# Patient Record
Sex: FEMALE | Race: WHITE | Employment: UNEMPLOYED | URBAN - METROPOLITAN AREA
[De-identification: names, ages, dates, MRNs, and addresses within clinical notes are randomized per-mention and may not be internally consistent; named-entity substitution may affect disease eponyms.]

---

## 2024-03-11 ENCOUNTER — APPOINTMENT (OUTPATIENT)
Dept: ULTRASOUND IMAGING | Age: 29
End: 2024-03-11

## 2024-03-11 ENCOUNTER — HOSPITAL ENCOUNTER (OUTPATIENT)
Age: 29
Setting detail: OBSERVATION
Discharge: HOME | End: 2024-03-12
Attending: INTERNAL MEDICINE | Admitting: INTERNAL MEDICINE

## 2024-03-11 DIAGNOSIS — K80.20 SYMPTOMATIC CHOLELITHIASIS: ICD-10-CM

## 2024-03-11 DIAGNOSIS — K80.20 CALCULUS OF GALLBLADDER WITHOUT CHOLECYSTITIS WITHOUT OBSTRUCTION: Primary | ICD-10-CM

## 2024-03-11 DIAGNOSIS — K81.9 CHOLECYSTITIS: ICD-10-CM

## 2024-03-11 LAB
ALBUMIN SERPL-MCNC: 4.1 GM/DL (ref 3.4–5)
ALP BLD-CCNC: 60 IU/L (ref 40–129)
ALT SERPL-CCNC: 13 U/L (ref 10–40)
ANION GAP SERPL CALCULATED.3IONS-SCNC: 14 MMOL/L (ref 7–16)
AST SERPL-CCNC: 14 IU/L (ref 15–37)
BASOPHILS ABSOLUTE: 0 K/CU MM
BASOPHILS RELATIVE PERCENT: 0.2 % (ref 0–1)
BILIRUB SERPL-MCNC: 0.6 MG/DL (ref 0–1)
BUN SERPL-MCNC: 25 MG/DL (ref 6–23)
CALCIUM SERPL-MCNC: 8.9 MG/DL (ref 8.3–10.6)
CHLORIDE BLD-SCNC: 103 MMOL/L (ref 99–110)
CO2: 22 MMOL/L (ref 21–32)
CREAT SERPL-MCNC: 0.9 MG/DL (ref 0.6–1.1)
DIFFERENTIAL TYPE: ABNORMAL
EOSINOPHILS ABSOLUTE: 0.1 K/CU MM
EOSINOPHILS RELATIVE PERCENT: 0.4 % (ref 0–3)
GFR SERPL CREATININE-BSD FRML MDRD: >60 ML/MIN/1.73M2
GLUCOSE SERPL-MCNC: 110 MG/DL (ref 70–99)
HCG QUALITATIVE: NEGATIVE
HCT VFR BLD CALC: 41 % (ref 37–47)
HEMOGLOBIN: 13.6 GM/DL (ref 12.5–16)
IMMATURE NEUTROPHIL %: 0.6 % (ref 0–0.43)
LIPASE: 30 IU/L (ref 13–60)
LYMPHOCYTES ABSOLUTE: 2.5 K/CU MM
LYMPHOCYTES RELATIVE PERCENT: 18.5 % (ref 24–44)
MCH RBC QN AUTO: 30.3 PG (ref 27–31)
MCHC RBC AUTO-ENTMCNC: 33.2 % (ref 32–36)
MCV RBC AUTO: 91.3 FL (ref 78–100)
MONOCYTES ABSOLUTE: 0.7 K/CU MM
MONOCYTES RELATIVE PERCENT: 5.1 % (ref 0–4)
NUCLEATED RBC %: 0 %
PDW BLD-RTO: 12.6 % (ref 11.7–14.9)
PLATELET # BLD: 236 K/CU MM (ref 140–440)
PMV BLD AUTO: 11.7 FL (ref 7.5–11.1)
POTASSIUM SERPL-SCNC: 3.5 MMOL/L (ref 3.5–5.1)
RBC # BLD: 4.49 M/CU MM (ref 4.2–5.4)
SEGMENTED NEUTROPHILS ABSOLUTE COUNT: 10.3 K/CU MM
SEGMENTED NEUTROPHILS RELATIVE PERCENT: 75.2 % (ref 36–66)
SODIUM BLD-SCNC: 139 MMOL/L (ref 135–145)
TOTAL IMMATURE NEUTOROPHIL: 0.08 K/CU MM
TOTAL NUCLEATED RBC: 0 K/CU MM
TOTAL PROTEIN: 7.3 GM/DL (ref 6.4–8.2)
WBC # BLD: 13.7 K/CU MM (ref 4–10.5)

## 2024-03-11 PROCEDURE — 96361 HYDRATE IV INFUSION ADD-ON: CPT

## 2024-03-11 PROCEDURE — 96375 TX/PRO/DX INJ NEW DRUG ADDON: CPT

## 2024-03-11 PROCEDURE — 6360000002 HC RX W HCPCS: Performed by: PHYSICIAN ASSISTANT

## 2024-03-11 PROCEDURE — 85025 COMPLETE CBC W/AUTO DIFF WBC: CPT

## 2024-03-11 PROCEDURE — 80053 COMPREHEN METABOLIC PANEL: CPT

## 2024-03-11 PROCEDURE — 96372 THER/PROPH/DIAG INJ SC/IM: CPT

## 2024-03-11 PROCEDURE — 96374 THER/PROPH/DIAG INJ IV PUSH: CPT

## 2024-03-11 PROCEDURE — 76705 ECHO EXAM OF ABDOMEN: CPT

## 2024-03-11 PROCEDURE — C9113 INJ PANTOPRAZOLE SODIUM, VIA: HCPCS | Performed by: PHYSICIAN ASSISTANT

## 2024-03-11 PROCEDURE — 84703 CHORIONIC GONADOTROPIN ASSAY: CPT

## 2024-03-11 PROCEDURE — 2580000003 HC RX 258: Performed by: PHYSICIAN ASSISTANT

## 2024-03-11 PROCEDURE — 99285 EMERGENCY DEPT VISIT HI MDM: CPT

## 2024-03-11 PROCEDURE — 83690 ASSAY OF LIPASE: CPT

## 2024-03-11 RX ORDER — PANTOPRAZOLE SODIUM 40 MG/10ML
40 INJECTION, POWDER, LYOPHILIZED, FOR SOLUTION INTRAVENOUS ONCE
Status: COMPLETED | OUTPATIENT
Start: 2024-03-11 | End: 2024-03-11

## 2024-03-11 RX ORDER — ONDANSETRON 2 MG/ML
4 INJECTION INTRAMUSCULAR; INTRAVENOUS EVERY 6 HOURS PRN
Status: DISCONTINUED | OUTPATIENT
Start: 2024-03-11 | End: 2024-03-12

## 2024-03-11 RX ORDER — 0.9 % SODIUM CHLORIDE 0.9 %
1000 INTRAVENOUS SOLUTION INTRAVENOUS ONCE
Status: COMPLETED | OUTPATIENT
Start: 2024-03-11 | End: 2024-03-12

## 2024-03-11 RX ADMIN — SODIUM CHLORIDE 1000 ML: 9 INJECTION, SOLUTION INTRAVENOUS at 23:22

## 2024-03-11 RX ADMIN — ONDANSETRON 4 MG: 2 INJECTION INTRAMUSCULAR; INTRAVENOUS at 23:22

## 2024-03-11 RX ADMIN — PANTOPRAZOLE SODIUM 40 MG: 40 INJECTION, POWDER, FOR SOLUTION INTRAVENOUS at 23:21

## 2024-03-11 ASSESSMENT — PAIN DESCRIPTION - DESCRIPTORS: DESCRIPTORS: SQUEEZING

## 2024-03-11 ASSESSMENT — LIFESTYLE VARIABLES
HOW MANY STANDARD DRINKS CONTAINING ALCOHOL DO YOU HAVE ON A TYPICAL DAY: 3 OR 4
HOW OFTEN DO YOU HAVE A DRINK CONTAINING ALCOHOL: MONTHLY OR LESS

## 2024-03-11 ASSESSMENT — PAIN DESCRIPTION - LOCATION: LOCATION: ABDOMEN

## 2024-03-11 ASSESSMENT — PAIN - FUNCTIONAL ASSESSMENT: PAIN_FUNCTIONAL_ASSESSMENT: 0-10

## 2024-03-11 ASSESSMENT — PAIN SCALES - GENERAL: PAINLEVEL_OUTOF10: 10

## 2024-03-12 ENCOUNTER — ANESTHESIA EVENT (OUTPATIENT)
Dept: OPERATING ROOM | Age: 29
End: 2024-03-12

## 2024-03-12 ENCOUNTER — ANESTHESIA (OUTPATIENT)
Dept: OPERATING ROOM | Age: 29
End: 2024-03-12

## 2024-03-12 VITALS
BODY MASS INDEX: 33.32 KG/M2 | HEART RATE: 60 BPM | DIASTOLIC BLOOD PRESSURE: 81 MMHG | TEMPERATURE: 97.7 F | SYSTOLIC BLOOD PRESSURE: 136 MMHG | WEIGHT: 200 LBS | HEIGHT: 65 IN | OXYGEN SATURATION: 99 % | RESPIRATION RATE: 16 BRPM

## 2024-03-12 PROBLEM — K80.20 SYMPTOMATIC CHOLELITHIASIS: Status: ACTIVE | Noted: 2024-03-12

## 2024-03-12 LAB
ALBUMIN SERPL-MCNC: 4 GM/DL (ref 3.4–5)
ALP BLD-CCNC: 59 IU/L (ref 40–128)
ALT SERPL-CCNC: 19 U/L (ref 10–40)
ANION GAP SERPL CALCULATED.3IONS-SCNC: 10 MMOL/L (ref 7–16)
AST SERPL-CCNC: 20 IU/L (ref 15–37)
BASOPHILS ABSOLUTE: 0 K/CU MM
BASOPHILS RELATIVE PERCENT: 0.2 % (ref 0–1)
BILIRUB SERPL-MCNC: 0.7 MG/DL (ref 0–1)
BILIRUBIN URINE: ABNORMAL MG/DL
BLOOD, URINE: NEGATIVE
BUN SERPL-MCNC: 19 MG/DL (ref 6–23)
CALCIUM SERPL-MCNC: 8.7 MG/DL (ref 8.3–10.6)
CHLORIDE BLD-SCNC: 104 MMOL/L (ref 99–110)
CLARITY: CLEAR
CO2: 25 MMOL/L (ref 21–32)
COLOR: YELLOW
COMMENT UA: ABNORMAL
CREAT SERPL-MCNC: 0.9 MG/DL (ref 0.6–1.1)
DIFFERENTIAL TYPE: ABNORMAL
EOSINOPHILS ABSOLUTE: 0 K/CU MM
EOSINOPHILS RELATIVE PERCENT: 0.1 % (ref 0–3)
GFR SERPL CREATININE-BSD FRML MDRD: >60 ML/MIN/1.73M2
GLUCOSE SERPL-MCNC: 112 MG/DL (ref 70–99)
GLUCOSE, URINE: NEGATIVE MG/DL
HCT VFR BLD CALC: 40.7 % (ref 37–47)
HEMOGLOBIN: 13.1 GM/DL (ref 12.5–16)
IMMATURE NEUTROPHIL %: 0.7 % (ref 0–0.43)
KETONES, URINE: 15 MG/DL
LEUKOCYTE ESTERASE, URINE: NEGATIVE
LYMPHOCYTES ABSOLUTE: 2.7 K/CU MM
LYMPHOCYTES RELATIVE PERCENT: 19.6 % (ref 24–44)
MCH RBC QN AUTO: 30.4 PG (ref 27–31)
MCHC RBC AUTO-ENTMCNC: 32.2 % (ref 32–36)
MCV RBC AUTO: 94.4 FL (ref 78–100)
MONOCYTES ABSOLUTE: 0.6 K/CU MM
MONOCYTES RELATIVE PERCENT: 4.4 % (ref 0–4)
NITRITE URINE, QUANTITATIVE: NEGATIVE
NUCLEATED RBC %: 0 %
PDW BLD-RTO: 12.6 % (ref 11.7–14.9)
PH, URINE: 7 (ref 5–8)
PLATELET # BLD: 229 K/CU MM (ref 140–440)
PMV BLD AUTO: 11.9 FL (ref 7.5–11.1)
POTASSIUM SERPL-SCNC: 3.6 MMOL/L (ref 3.5–5.1)
PROTEIN UA: NEGATIVE MG/DL
RBC # BLD: 4.31 M/CU MM (ref 4.2–5.4)
SEGMENTED NEUTROPHILS ABSOLUTE COUNT: 10.2 K/CU MM
SEGMENTED NEUTROPHILS RELATIVE PERCENT: 75 % (ref 36–66)
SODIUM BLD-SCNC: 139 MMOL/L (ref 135–145)
SPECIFIC GRAVITY UA: 1.02 (ref 1–1.03)
TOTAL IMMATURE NEUTOROPHIL: 0.09 K/CU MM
TOTAL NUCLEATED RBC: 0 K/CU MM
TOTAL PROTEIN: 6.9 GM/DL (ref 6.4–8.2)
UROBILINOGEN, URINE: 0.2 MG/DL (ref 0.2–1)
WBC # BLD: 13.6 K/CU MM (ref 4–10.5)

## 2024-03-12 PROCEDURE — 3600000004 HC SURGERY LEVEL 4 BASE: Performed by: SURGERY

## 2024-03-12 PROCEDURE — 3700000000 HC ANESTHESIA ATTENDED CARE: Performed by: SURGERY

## 2024-03-12 PROCEDURE — 99222 1ST HOSP IP/OBS MODERATE 55: CPT | Performed by: SURGERY

## 2024-03-12 PROCEDURE — 2709999900 HC NON-CHARGEABLE SUPPLY: Performed by: SURGERY

## 2024-03-12 PROCEDURE — 7100000001 HC PACU RECOVERY - ADDTL 15 MIN: Performed by: SURGERY

## 2024-03-12 PROCEDURE — 7100000000 HC PACU RECOVERY - FIRST 15 MIN: Performed by: SURGERY

## 2024-03-12 PROCEDURE — 6360000002 HC RX W HCPCS: Performed by: SURGERY

## 2024-03-12 PROCEDURE — 47562 LAPAROSCOPIC CHOLECYSTECTOMY: CPT | Performed by: SURGERY

## 2024-03-12 PROCEDURE — 2780000010 HC IMPLANT OTHER: Performed by: SURGERY

## 2024-03-12 PROCEDURE — 85025 COMPLETE CBC W/AUTO DIFF WBC: CPT

## 2024-03-12 PROCEDURE — 96375 TX/PRO/DX INJ NEW DRUG ADDON: CPT

## 2024-03-12 PROCEDURE — 6370000000 HC RX 637 (ALT 250 FOR IP): Performed by: ANESTHESIOLOGY

## 2024-03-12 PROCEDURE — 6360000002 HC RX W HCPCS: Performed by: PHYSICIAN ASSISTANT

## 2024-03-12 PROCEDURE — 6360000002 HC RX W HCPCS: Performed by: NURSE ANESTHETIST, CERTIFIED REGISTERED

## 2024-03-12 PROCEDURE — 6360000002 HC RX W HCPCS: Performed by: ANESTHESIOLOGY

## 2024-03-12 PROCEDURE — 3700000001 HC ADD 15 MINUTES (ANESTHESIA): Performed by: SURGERY

## 2024-03-12 PROCEDURE — C1889 IMPLANT/INSERT DEVICE, NOC: HCPCS | Performed by: SURGERY

## 2024-03-12 PROCEDURE — 2580000003 HC RX 258: Performed by: INTERNAL MEDICINE

## 2024-03-12 PROCEDURE — G0378 HOSPITAL OBSERVATION PER HR: HCPCS

## 2024-03-12 PROCEDURE — 7100000011 HC PHASE II RECOVERY - ADDTL 15 MIN: Performed by: SURGERY

## 2024-03-12 PROCEDURE — 7100000010 HC PHASE II RECOVERY - FIRST 15 MIN: Performed by: SURGERY

## 2024-03-12 PROCEDURE — 2720000010 HC SURG SUPPLY STERILE: Performed by: SURGERY

## 2024-03-12 PROCEDURE — 2500000003 HC RX 250 WO HCPCS: Performed by: NURSE ANESTHETIST, CERTIFIED REGISTERED

## 2024-03-12 PROCEDURE — 81003 URINALYSIS AUTO W/O SCOPE: CPT

## 2024-03-12 PROCEDURE — 80053 COMPREHEN METABOLIC PANEL: CPT

## 2024-03-12 PROCEDURE — 2580000003 HC RX 258: Performed by: SURGERY

## 2024-03-12 PROCEDURE — 3600000014 HC SURGERY LEVEL 4 ADDTL 15MIN: Performed by: SURGERY

## 2024-03-12 PROCEDURE — 88304 TISSUE EXAM BY PATHOLOGIST: CPT

## 2024-03-12 PROCEDURE — 94761 N-INVAS EAR/PLS OXIMETRY MLT: CPT

## 2024-03-12 DEVICE — CLIP INT L12MM POLYMER DISP LAPRO-CLP: Type: IMPLANTABLE DEVICE | Site: BILE DUCT | Status: FUNCTIONAL

## 2024-03-12 DEVICE — CLIP INT L POLYMER LOK LIG HEM O LOK (6EA/PK): Type: IMPLANTABLE DEVICE | Site: BILE DUCT | Status: FUNCTIONAL

## 2024-03-12 RX ORDER — MORPHINE SULFATE 4 MG/ML
4 INJECTION, SOLUTION INTRAMUSCULAR; INTRAVENOUS
Status: DISCONTINUED | OUTPATIENT
Start: 2024-03-12 | End: 2024-03-12

## 2024-03-12 RX ORDER — OXYCODONE HYDROCHLORIDE AND ACETAMINOPHEN 5; 325 MG/1; MG/1
1 TABLET ORAL ONCE
Status: COMPLETED | OUTPATIENT
Start: 2024-03-12 | End: 2024-03-12

## 2024-03-12 RX ORDER — SODIUM CHLORIDE 9 MG/ML
INJECTION, SOLUTION INTRAVENOUS PRN
Status: DISCONTINUED | OUTPATIENT
Start: 2024-03-12 | End: 2024-03-12 | Stop reason: HOSPADM

## 2024-03-12 RX ORDER — OXYCODONE HYDROCHLORIDE AND ACETAMINOPHEN 5; 325 MG/1; MG/1
1 TABLET ORAL EVERY 6 HOURS PRN
Qty: 14 TABLET | Refills: 0 | Status: ON HOLD | OUTPATIENT
Start: 2024-03-12 | End: 2024-03-17

## 2024-03-12 RX ORDER — PROPOFOL 10 MG/ML
INJECTION, EMULSION INTRAVENOUS PRN
Status: DISCONTINUED | OUTPATIENT
Start: 2024-03-12 | End: 2024-03-12 | Stop reason: SDUPTHER

## 2024-03-12 RX ORDER — ONDANSETRON 2 MG/ML
4 INJECTION INTRAMUSCULAR; INTRAVENOUS EVERY 6 HOURS PRN
Status: DISCONTINUED | OUTPATIENT
Start: 2024-03-12 | End: 2024-03-12 | Stop reason: HOSPADM

## 2024-03-12 RX ORDER — FENTANYL CITRATE 50 UG/ML
INJECTION, SOLUTION INTRAMUSCULAR; INTRAVENOUS PRN
Status: DISCONTINUED | OUTPATIENT
Start: 2024-03-12 | End: 2024-03-12 | Stop reason: SDUPTHER

## 2024-03-12 RX ORDER — POTASSIUM CHLORIDE 7.45 MG/ML
10 INJECTION INTRAVENOUS PRN
Status: DISCONTINUED | OUTPATIENT
Start: 2024-03-12 | End: 2024-03-12 | Stop reason: HOSPADM

## 2024-03-12 RX ORDER — POTASSIUM CHLORIDE 20 MEQ/1
40 TABLET, EXTENDED RELEASE ORAL PRN
Status: DISCONTINUED | OUTPATIENT
Start: 2024-03-12 | End: 2024-03-12 | Stop reason: HOSPADM

## 2024-03-12 RX ORDER — POLYETHYLENE GLYCOL 3350 17 G/17G
17 POWDER, FOR SOLUTION ORAL DAILY PRN
Status: DISCONTINUED | OUTPATIENT
Start: 2024-03-12 | End: 2024-03-12 | Stop reason: HOSPADM

## 2024-03-12 RX ORDER — PANTOPRAZOLE SODIUM 40 MG/10ML
40 INJECTION, POWDER, LYOPHILIZED, FOR SOLUTION INTRAVENOUS DAILY
Status: DISCONTINUED | OUTPATIENT
Start: 2024-03-12 | End: 2024-03-12 | Stop reason: SDUPTHER

## 2024-03-12 RX ORDER — SODIUM CHLORIDE 0.9 % (FLUSH) 0.9 %
5-40 SYRINGE (ML) INJECTION EVERY 12 HOURS SCHEDULED
Status: DISCONTINUED | OUTPATIENT
Start: 2024-03-12 | End: 2024-03-12 | Stop reason: HOSPADM

## 2024-03-12 RX ORDER — MIDAZOLAM HYDROCHLORIDE 1 MG/ML
INJECTION INTRAMUSCULAR; INTRAVENOUS PRN
Status: DISCONTINUED | OUTPATIENT
Start: 2024-03-12 | End: 2024-03-12 | Stop reason: SDUPTHER

## 2024-03-12 RX ORDER — FENTANYL CITRATE 50 UG/ML
50 INJECTION, SOLUTION INTRAMUSCULAR; INTRAVENOUS EVERY 5 MIN PRN
Status: DISCONTINUED | OUTPATIENT
Start: 2024-03-12 | End: 2024-03-12 | Stop reason: HOSPADM

## 2024-03-12 RX ORDER — SODIUM CHLORIDE 0.9 % (FLUSH) 0.9 %
5-40 SYRINGE (ML) INJECTION PRN
Status: DISCONTINUED | OUTPATIENT
Start: 2024-03-12 | End: 2024-03-12 | Stop reason: HOSPADM

## 2024-03-12 RX ORDER — GLYCOPYRROLATE 0.2 MG/ML
INJECTION INTRAMUSCULAR; INTRAVENOUS PRN
Status: DISCONTINUED | OUTPATIENT
Start: 2024-03-12 | End: 2024-03-12 | Stop reason: SDUPTHER

## 2024-03-12 RX ORDER — ONDANSETRON 4 MG/1
4 TABLET, ORALLY DISINTEGRATING ORAL EVERY 8 HOURS PRN
Status: DISCONTINUED | OUTPATIENT
Start: 2024-03-12 | End: 2024-03-12 | Stop reason: HOSPADM

## 2024-03-12 RX ORDER — ONDANSETRON 2 MG/ML
INJECTION INTRAMUSCULAR; INTRAVENOUS PRN
Status: DISCONTINUED | OUTPATIENT
Start: 2024-03-12 | End: 2024-03-12 | Stop reason: SDUPTHER

## 2024-03-12 RX ORDER — ONDANSETRON 2 MG/ML
4 INJECTION INTRAMUSCULAR; INTRAVENOUS ONCE
Status: COMPLETED | OUTPATIENT
Start: 2024-03-12 | End: 2024-03-12

## 2024-03-12 RX ORDER — KETOROLAC TROMETHAMINE 30 MG/ML
INJECTION, SOLUTION INTRAMUSCULAR; INTRAVENOUS PRN
Status: DISCONTINUED | OUTPATIENT
Start: 2024-03-12 | End: 2024-03-12 | Stop reason: SDUPTHER

## 2024-03-12 RX ORDER — BUPIVACAINE HYDROCHLORIDE 5 MG/ML
INJECTION, SOLUTION EPIDURAL; INTRACAUDAL
Status: COMPLETED | OUTPATIENT
Start: 2024-03-12 | End: 2024-03-12

## 2024-03-12 RX ORDER — DEXAMETHASONE SODIUM PHOSPHATE 4 MG/ML
INJECTION, SOLUTION INTRA-ARTICULAR; INTRALESIONAL; INTRAMUSCULAR; INTRAVENOUS; SOFT TISSUE PRN
Status: DISCONTINUED | OUTPATIENT
Start: 2024-03-12 | End: 2024-03-12 | Stop reason: SDUPTHER

## 2024-03-12 RX ORDER — ONDANSETRON 2 MG/ML
4 INJECTION INTRAMUSCULAR; INTRAVENOUS
Status: COMPLETED | OUTPATIENT
Start: 2024-03-12 | End: 2024-03-12

## 2024-03-12 RX ORDER — DOCUSATE SODIUM 100 MG/1
100 CAPSULE, LIQUID FILLED ORAL 2 TIMES DAILY
Qty: 60 CAPSULE | Refills: 0 | Status: ON HOLD | COMMUNITY
Start: 2024-03-12 | End: 2024-03-26

## 2024-03-12 RX ORDER — SODIUM CHLORIDE 9 MG/ML
INJECTION, SOLUTION INTRAVENOUS CONTINUOUS
Status: DISCONTINUED | OUTPATIENT
Start: 2024-03-12 | End: 2024-03-12 | Stop reason: HOSPADM

## 2024-03-12 RX ORDER — ACETAMINOPHEN 650 MG/1
650 SUPPOSITORY RECTAL EVERY 6 HOURS PRN
Status: DISCONTINUED | OUTPATIENT
Start: 2024-03-12 | End: 2024-03-12 | Stop reason: HOSPADM

## 2024-03-12 RX ORDER — NALOXONE HYDROCHLORIDE 0.4 MG/ML
INJECTION, SOLUTION INTRAMUSCULAR; INTRAVENOUS; SUBCUTANEOUS PRN
Status: DISCONTINUED | OUTPATIENT
Start: 2024-03-12 | End: 2024-03-12 | Stop reason: HOSPADM

## 2024-03-12 RX ORDER — MAGNESIUM SULFATE IN WATER 40 MG/ML
2000 INJECTION, SOLUTION INTRAVENOUS PRN
Status: DISCONTINUED | OUTPATIENT
Start: 2024-03-12 | End: 2024-03-12 | Stop reason: HOSPADM

## 2024-03-12 RX ORDER — LIDOCAINE HYDROCHLORIDE 20 MG/ML
INJECTION, SOLUTION INTRAVENOUS PRN
Status: DISCONTINUED | OUTPATIENT
Start: 2024-03-12 | End: 2024-03-12 | Stop reason: SDUPTHER

## 2024-03-12 RX ORDER — MORPHINE SULFATE 2 MG/ML
2 INJECTION, SOLUTION INTRAMUSCULAR; INTRAVENOUS
Status: DISCONTINUED | OUTPATIENT
Start: 2024-03-12 | End: 2024-03-12 | Stop reason: HOSPADM

## 2024-03-12 RX ORDER — MORPHINE SULFATE 4 MG/ML
4 INJECTION, SOLUTION INTRAMUSCULAR; INTRAVENOUS ONCE
Status: COMPLETED | OUTPATIENT
Start: 2024-03-12 | End: 2024-03-12

## 2024-03-12 RX ORDER — ROCURONIUM BROMIDE 10 MG/ML
INJECTION, SOLUTION INTRAVENOUS PRN
Status: DISCONTINUED | OUTPATIENT
Start: 2024-03-12 | End: 2024-03-12 | Stop reason: SDUPTHER

## 2024-03-12 RX ORDER — ACETAMINOPHEN 325 MG/1
650 TABLET ORAL EVERY 6 HOURS PRN
Status: DISCONTINUED | OUTPATIENT
Start: 2024-03-12 | End: 2024-03-12 | Stop reason: HOSPADM

## 2024-03-12 RX ORDER — MORPHINE SULFATE 4 MG/ML
4 INJECTION, SOLUTION INTRAMUSCULAR; INTRAVENOUS
Status: DISCONTINUED | OUTPATIENT
Start: 2024-03-12 | End: 2024-03-12 | Stop reason: HOSPADM

## 2024-03-12 RX ORDER — PROMETHAZINE HYDROCHLORIDE 25 MG/ML
25 INJECTION, SOLUTION INTRAMUSCULAR; INTRAVENOUS ONCE
Status: COMPLETED | OUTPATIENT
Start: 2024-03-12 | End: 2024-03-12

## 2024-03-12 RX ADMIN — HYDROMORPHONE HYDROCHLORIDE 0.5 MG: 1 INJECTION, SOLUTION INTRAMUSCULAR; INTRAVENOUS; SUBCUTANEOUS at 10:53

## 2024-03-12 RX ADMIN — MORPHINE SULFATE 4 MG: 4 INJECTION, SOLUTION INTRAMUSCULAR; INTRAVENOUS at 01:03

## 2024-03-12 RX ADMIN — PROPOFOL 200 MG: 10 INJECTION, EMULSION INTRAVENOUS at 10:25

## 2024-03-12 RX ADMIN — FENTANYL CITRATE 50 MCG: 50 INJECTION, SOLUTION INTRAMUSCULAR; INTRAVENOUS at 10:22

## 2024-03-12 RX ADMIN — ONDANSETRON 4 MG: 2 INJECTION INTRAMUSCULAR; INTRAVENOUS at 13:11

## 2024-03-12 RX ADMIN — SUGAMMADEX 200 MG: 100 INJECTION, SOLUTION INTRAVENOUS at 11:15

## 2024-03-12 RX ADMIN — PROMETHAZINE HYDROCHLORIDE 25 MG: 25 INJECTION INTRAMUSCULAR; INTRAVENOUS at 01:08

## 2024-03-12 RX ADMIN — DEXAMETHASONE SODIUM PHOSPHATE 4 MG: 4 INJECTION, SOLUTION INTRAMUSCULAR; INTRAVENOUS at 10:42

## 2024-03-12 RX ADMIN — FENTANYL CITRATE 50 MCG: 50 INJECTION, SOLUTION INTRAMUSCULAR; INTRAVENOUS at 11:45

## 2024-03-12 RX ADMIN — SODIUM CHLORIDE: 9 INJECTION, SOLUTION INTRAVENOUS at 10:52

## 2024-03-12 RX ADMIN — MIDAZOLAM 2 MG: 1 INJECTION INTRAMUSCULAR; INTRAVENOUS at 10:15

## 2024-03-12 RX ADMIN — FENTANYL CITRATE 50 MCG: 50 INJECTION, SOLUTION INTRAMUSCULAR; INTRAVENOUS at 12:20

## 2024-03-12 RX ADMIN — ROCURONIUM BROMIDE 50 MG: 10 INJECTION INTRAVENOUS at 10:26

## 2024-03-12 RX ADMIN — GLYCOPYRROLATE 0.2 MG: 0.2 INJECTION, SOLUTION INTRAMUSCULAR; INTRAVENOUS at 10:54

## 2024-03-12 RX ADMIN — ONDANSETRON 4 MG: 2 INJECTION INTRAMUSCULAR; INTRAVENOUS at 11:41

## 2024-03-12 RX ADMIN — SODIUM CHLORIDE: 9 INJECTION, SOLUTION INTRAVENOUS at 04:40

## 2024-03-12 RX ADMIN — OXYCODONE AND ACETAMINOPHEN 1 TABLET: 5; 325 TABLET ORAL at 13:43

## 2024-03-12 RX ADMIN — LIDOCAINE HYDROCHLORIDE 100 MG: 20 INJECTION, SOLUTION INTRAVENOUS at 10:25

## 2024-03-12 RX ADMIN — FENTANYL CITRATE 50 MCG: 50 INJECTION, SOLUTION INTRAMUSCULAR; INTRAVENOUS at 11:02

## 2024-03-12 RX ADMIN — FENTANYL CITRATE 50 MCG: 50 INJECTION, SOLUTION INTRAMUSCULAR; INTRAVENOUS at 11:36

## 2024-03-12 RX ADMIN — ONDANSETRON 4 MG: 2 INJECTION INTRAMUSCULAR; INTRAVENOUS at 11:15

## 2024-03-12 RX ADMIN — CEFAZOLIN 2000 MG: 2 INJECTION, POWDER, FOR SOLUTION INTRAMUSCULAR; INTRAVENOUS at 10:13

## 2024-03-12 RX ADMIN — KETOROLAC TROMETHAMINE 30 MG: 30 INJECTION, SOLUTION INTRAMUSCULAR; INTRAVENOUS at 11:15

## 2024-03-12 ASSESSMENT — PAIN DESCRIPTION - FREQUENCY
FREQUENCY: CONTINUOUS
FREQUENCY: INTERMITTENT

## 2024-03-12 ASSESSMENT — PAIN DESCRIPTION - ONSET
ONSET: GRADUAL
ONSET: ON-GOING

## 2024-03-12 ASSESSMENT — PAIN SCALES - GENERAL
PAINLEVEL_OUTOF10: 6
PAINLEVEL_OUTOF10: 5
PAINLEVEL_OUTOF10: 10
PAINLEVEL_OUTOF10: 6
PAINLEVEL_OUTOF10: 6
PAINLEVEL_OUTOF10: 8

## 2024-03-12 ASSESSMENT — PAIN DESCRIPTION - ORIENTATION
ORIENTATION: MID

## 2024-03-12 ASSESSMENT — PAIN - FUNCTIONAL ASSESSMENT
PAIN_FUNCTIONAL_ASSESSMENT: 0-10
PAIN_FUNCTIONAL_ASSESSMENT: PREVENTS OR INTERFERES SOME ACTIVE ACTIVITIES AND ADLS
PAIN_FUNCTIONAL_ASSESSMENT: 0-10
PAIN_FUNCTIONAL_ASSESSMENT: PREVENTS OR INTERFERES SOME ACTIVE ACTIVITIES AND ADLS
PAIN_FUNCTIONAL_ASSESSMENT: 0-10
PAIN_FUNCTIONAL_ASSESSMENT: ACTIVITIES ARE NOT PREVENTED
PAIN_FUNCTIONAL_ASSESSMENT: PREVENTS OR INTERFERES SOME ACTIVE ACTIVITIES AND ADLS
PAIN_FUNCTIONAL_ASSESSMENT: 0-10

## 2024-03-12 ASSESSMENT — PAIN DESCRIPTION - DESCRIPTORS
DESCRIPTORS: ACHING
DESCRIPTORS: SORE
DESCRIPTORS: ACHING
DESCRIPTORS: ACHING
DESCRIPTORS: ACHING;SORE

## 2024-03-12 ASSESSMENT — PAIN DESCRIPTION - LOCATION
LOCATION: ABDOMEN

## 2024-03-12 ASSESSMENT — PAIN DESCRIPTION - PAIN TYPE
TYPE: SURGICAL PAIN

## 2024-03-12 NOTE — DISCHARGE SUMMARY
V2.0  Discharge Summary    Name:  Ida Mcclellan /Age/Sex: 1995 (28 y.o. female)   Admit Date: 3/11/2024  Discharge Date: 3/12/24    MRN & CSN:  [id number] & [id number] Encounter Date and Time 3/12/24 12:20 PM EDT    Attending:  Chavo Gaines MD Discharging Provider: KASHMIR Sheffield CNP       Hospital Course:     Brief HPI: Ida Mcclellan is a 28 y.o. female with unremarkable past medical history who presented with abdominal pain since Saturday.  Patient's ultrasound indicated cholelithiasis.  White cell elevated at 13.7.  General surgeon evaluated the patient and    Brief Problem Based Course:   ***      The patient expressed appropriate understanding of, and agreement with the discharge recommendations, medications, and plan.     Consults this admission:  IP CONSULT TO GENERAL SURGERY    Discharge Diagnosis:   Symptomatic cholelithiasis    ***    Discharge Instruction:   Follow up appointments: ***  Primary care physician: Lux Eisenberg MD within 2 weeks  Diet: {diet:72657}   Activity: {discharge activity:99063}  Disposition: Discharged to:   []Home, [][facility], []SNF, []Acute Rehab, []Other Acute Care Facility, []Hospice   Condition on discharge: Stable  Labs and Tests to be Followed up as an outpatient by PCP or Specialist: ***    Discharge Medications:        Medication List        START taking these medications      docusate sodium 100 MG capsule  Commonly known as: COLACE  Take 1 capsule by mouth 2 times daily for 14 days     oxyCODONE-acetaminophen 5-325 MG per tablet  Commonly known as: Percocet  Take 1 tablet by mouth every 6 hours as needed for Pain for up to 5 days. Intended supply: 5 days. Take lowest dose possible to manage pain Max Daily Amount: 4 tablets            CONTINUE taking these medications      ibuprofen 800 MG tablet  Commonly known as: ADVIL;MOTRIN  Take 1 tablet by mouth every 6 hours as needed for Pain            ASK your doctor about these medications

## 2024-03-12 NOTE — CONSULTS
17 g  17 g Oral Daily PRN Chavo Gaines MD        acetaminophen (TYLENOL) tablet 650 mg  650 mg Oral Q6H PRN Chavo Gaines MD        Or    acetaminophen (TYLENOL) suppository 650 mg  650 mg Rectal Q6H PRN Chavo Gaines MD        0.9 % sodium chloride infusion   IntraVENous Continuous Chavo Gaines  mL/hr at 03/12/24 0440 New Bag at 03/12/24 0440    morphine (PF) injection 2 mg  2 mg IntraVENous Q2H PRN Chavo Gaines MD        Or    morphine sulfate (PF) injection 4 mg  4 mg IntraVENous Q2H PRN Chavo Gaines MD        pantoprazole (PROTONIX) 40 mg in sodium chloride (PF) 0.9 % 10 mL injection  40 mg IntraVENous Daily Chavo Gaines MD         Current Outpatient Medications   Medication Sig Dispense Refill    clotrimazole-betamethasone (LOTRISONE) 1-0.05 % cream Apply topically 2 times daily. 1 Tube 0    ibuprofen (ADVIL;MOTRIN) 800 MG tablet Take 1 tablet by mouth every 6 hours as needed for Pain 30 tablet 0       Allergies:  Patient has no known allergies.    Social History:   Social History     Socioeconomic History    Marital status: Single   Tobacco Use    Smoking status: Every Day     Current packs/day: 0.50     Types: Cigarettes       Family History:   No family history on file.    REVIEW OF SYSTEMS:    Constitutional: Negative for chills. Negative for fever.   HENT: Negative for congestion. Negative for rhinorrhea.    Respiratory: Negative for cough. Negative for shortness of breath.  Negative for wheezing.    Cardiovascular: Negative for chest pain.   Gastrointestinal:  as per HPI  Genitourinary: Negative for difficulty urinating.   Neurological: Negative for dizziness, syncope and numbness.   Hematological: Does not bruise/bleed easily.       PHYSICAL EXAM:  Vitals:    03/12/24 0459 03/12/24 0601 03/12/24 0632 03/12/24 0633   BP:  106/62 98/60    Pulse: 54 (!) 47 50 50   Resp: 16 15 16 14   Temp:       TempSrc:       SpO2: 95% 97% 96% 95%   Weight:

## 2024-03-12 NOTE — ANESTHESIA PRE PROCEDURE
intravenous.      Anesthetic plan and risks discussed with patient.      Plan discussed with CRNA.                Chicho Dietz MD   3/12/2024

## 2024-03-12 NOTE — ED TRIAGE NOTES
Abdominal pain started approx 3 days pain increasing tonight with after dinner, patient she has been vomiting on and off also.

## 2024-03-12 NOTE — ED PROVIDER NOTES
EMERGENCY DEPARTMENT ENCOUNTER        Pt Name: Ida Mcclellan  MRN: [id number]  Birthdate 1995  Date of evaluation: 3/11/2024  Provider: Madeleine Bautista PA-C  PCP: Lux Eisenberg MD    YOLANDA. I have evaluated this patient.        Triage CHIEF COMPLAINT     No chief complaint on file.        HISTORY OF PRESENT ILLNESS      Chief Complaint: Abdominal pain    Ida Mcclellan is a 28 y.o. female who presents for abdominal pain.  Onset was 2 days ago after drinking the night before.  It increased tonight after eating dinner (chicken, baked potato/sour cream).  Epigastric.  Sharp, stabbing.  Associated n/v.   states she has been having these symptoms for at least 9 months.  She was supposed to see GI but never did.  She did buy some Prilosec awhile back but left it when they moved recently.      Nursing Notes were all reviewed and agreed with or any disagreements were addressed in the HPI.    REVIEW OF SYSTEMS     CONSTITUTIONAL:  Denies fever.  EYES:  Denies visual changes.  HEAD:  Denies headache.  ENT:  Denies earache, nasal congestion, sore throat.  NECK:  Denies neck pain.  RESPIRATORY:  Denies any shortness of breath.  CARDIOVASCULAR:  Denies chest pain.  GI:  + abd pain, n/v.    :  Denies urinary symptoms.  MUSCULOSKELETAL:  Denies extremity pain or swelling.  BACK:  Denies back pain.  INTEGUMENT:  Denies skin changes.  LYMPHATIC:  Denies lymphadenopathy.  NEUROLOGIC:  Denies any numbness/tingling.  PSYCHIATRIC:  Denies SI/HI.    PAST MEDICAL HISTORY     Past Medical History:   Diagnosis Date    Thyroid disease        SURGICAL HISTORY   No past surgical history on file.    CURRENTMEDICATIONS       Previous Medications    CLOTRIMAZOLE-BETAMETHASONE (LOTRISONE) 1-0.05 % CREAM    Apply topically 2 times daily.    IBUPROFEN (ADVIL;MOTRIN) 800 MG TABLET    Take 1 tablet by mouth every 6 hours as needed for Pain       ALLERGIES     Patient has no known allergies.    FAMILYHISTORY     No family history

## 2024-03-12 NOTE — DISCHARGE INSTRUCTIONS
your incision becomes red, tender, or has drainage of pus.      If follow up appointment was not given to you, call the Surgical Clinic at [phone] for follow up appointment with Dr. Weston in:  1-2 weeks.

## 2024-03-12 NOTE — PROGRESS NOTES
Returned to room 21. Alert and oriented. Respirations even and unlabored. Color pink. Abdominal incisions x4 are intact. Scant amount of dried blood noted in umbilicus. Beverage provided. Call light in reach.

## 2024-03-12 NOTE — ANESTHESIA POSTPROCEDURE EVALUATION
Department of Anesthesiology  Postprocedure Note    Patient: Ida Mcclellan  MRN: [id number]  YOB: 1995  Date of evaluation: 3/12/2024    Procedure Summary       Date: 03/12/24 Room / Location: [address]    Anesthesia Start: 1018 Anesthesia Stop: 1133    Procedure: CHOLECYSTECTOMY LAPAROSCOPIC (Abdomen) Diagnosis:       Cholecystitis      (Cholecystitis [K81.9])    Surgeons: Herrera Weston MD Responsible Provider: Chicho Dietz MD    Anesthesia Type: general ASA Status: 2            Anesthesia Type: No value filed.    Na Phase I: Na Score: 8    Na Phase II:      Anesthesia Post Evaluation    Patient location during evaluation: bedside  Patient participation: complete - patient participated  Level of consciousness: awake  Pain score: 0  Airway patency: patent  Nausea & Vomiting: no nausea and no vomiting  Cardiovascular status: hemodynamically stable  Respiratory status: acceptable  Hydration status: euvolemic  Pain management: adequate    No notable events documented.

## 2024-03-12 NOTE — ED NOTES
Dr Weston returned call  
Paged Dr Weston  
Pt transported to OR at this time.   
Left Antecubital (Active)   Site Assessment Clean, dry & intact 03/11/24 2248   Dressing Status Clean, dry & intact 03/11/24 2248       Pertinent or High Risk Medications/Drips: no   If Yes, please provide details:   Blood Product Administration: no  If Yes, please provide details:     Recommendation    Incomplete orders none  Additional Comments:    If any further questions, please call Sending RN at [phone]    Electronically signed by: Electronically signed by Monica Pierce RN on 3/12/2024 at 4:02 AM

## 2024-03-12 NOTE — OP NOTE
the stay side and one on the specimen side for each structure) and the cystic duct and cystic artery were ligated with Endo Adilson.     The gallbladder was dissected from the liver bed in retrograde fashion with the electrocautery. An Endo Catch specimen retrieval bag was introduced into the patient's abdomen and the gallbladder was placed into the bag. The gallbladder was removed from the patient's abdomen under direct vision.    The liver bed was inspected. Hemostasis was appropriate.  Any spilled blood or bile was removed with a raytec sponge which was then removed from the abdomen.    The port site the gallbladder was removed through was closed using a suture passer and 0 vicryl suture. Pneumoperitoneum was desufflated after viewing removal of the remaining trocars under direct vision.    The wounds were then closed with absorbable suture. Dermabond was applied.    At the end of the case, instrument counts, sponge counts, and needle counts were correct.      The patient was extubated and transferred to the PACU in stable condition.    Electronically signed by Herrera Weston MD on 3/12/2024 at 11:25 AM

## 2024-03-12 NOTE — PROGRESS NOTES
1130: pt arrived in PACU. Monitors applied and alarms on. Report from Manuel ANGELES and Michelle ARREGUIN. SCDs attached to pt.  1135: x 3 incision sites present, closed with glue, no drainage noted.   1136: medicated for pain, 50 mcg Fentanyl given. Ice chips given  1141: mediated for nausea. 4 mg Zofran given  1145: medicated for pain, 50 mcg Fentanyl given

## 2024-03-12 NOTE — H&P
History and Physical      Name:  Ida Mcclellan /Age/Sex: 1995  (28 y.o. female)   MRN & CSN:  [id number] & [id number] Encounter Date/Time: 3/12/2024 2:34 AM EDT   Location:  ED02/ED-02 PCP: Lux Eisenberg MD       Hospital Day: 2    Assessment and Plan:     #.  Symptomatic cholelithiasis  -Ultrasound abdomen-cholelithiasis with borderline wall thickening and positive Jimenez sign.  -Dr. Weston consulted from ED.  -Keep patient n.p.o.  -Continue IV fluids, antiemetics, analgesics as needed    #.  Leukocytosis  -WBC count was 13 in   -Monitor with repeat CBC    #.  Bradycardia  -Patient's heart rate was in mid 40s during sleep, improved to 63 when patient was awake and talking to me    #.  Patient admits to vaping    #.  Obesity with BMI 33.28    Disposition:   Current Living situation: home    Diet NPO   DVT Prophylaxis No chemical prophylaxis for anticipated surgery in a.m.   Code Status FULL   Surrogate Decision Maker/ POA      History from:   EMR, patient.    History of Present Illness:     Chief Complaint: Symptomatic cholelithiasis  Ida Mcclellan is a 28 y.o. female with no significant past medical history presented to ED with complaints of abdominal pain since Saturday.  Patient reported epigastric abdominal pain, constant, associated with nausea, vomiting.  Patient had poor oral intake.  Patient also reported having similar symptoms in the past when she woke up in the middle of night and vomited.  Patient denying any fever, chills, cough, chest pain, shortness of breath.  Admits to poor oral intake, decreased urine output and being constipated.  Patient had a small bowel movement today morning.  Vitals on arrival-/88, HR 53, RR 22, temp 98.4, saturating 99% on room air.  Labs significant for BUN 25, random glucose 110, LFTs within normal range, WBC 13.7.  Ultrasound abdomen with cholelithiasis.  General surgeon Dr. Weston consulted from ED.  Patient received NS bolus, Phenergan,

## 2024-03-12 NOTE — PROGRESS NOTES
1305 Report received from Lizbeth ANGELES. Pt medicated for nausea.   1330 Pt requesting pain med now.  Has boyfriend at bedside with 2 small, crying children at bedside.   1343 Pt medicated for pain. Boyfriend took children out to car to help make environment more peaceful for patient.   1430  Pt appears restful on cart. States that pain is down to 3/10 now. States is just very tired. IV x 2 dc'd for pt to get dressed.   1445 Pt instructed for discharge care and follow-up and use of Rx with understanding voiced.  Pt to car at exit per wheelchair.

## 2024-03-13 NOTE — DISCHARGE SUMMARY
Physician Discharge Summary     Patient ID:  Ida Mcclellan  [id number]  28 y.o.  1995    Admit date: 3/11/2024    Discharge date and time: 3/12/2024  2:45 PM     Admission Physician: epigastric pain, nausea    Discharge Physician: Herrera Weston MD    Admission Diagnoses: Calculus of gallbladder without cholecystitis without obstruction [K80.20]  Symptomatic cholelithiasis [K80.20]    Discharge Diagnoses: same    Admission Condition:fair    Discharged Condition: good    Indication for Admission: symptomatic cholelithiasis    Hospital Course:  Patient had uneventful hospital course. She underwent laparoscopic cholecystectomy.  Patient was able to tolerate diet, ambulate, and have good pain control prior to discharge.    Consults: none    Outstanding Order Results       No orders found from 2/11/2024 to 3/12/2024.            Treatments: surgery: laparoscopic cholecystectomy     Discharge Exam:  Physical Exam  General: awake, alert, in no acute distress  HEENT: mucous membranes moist  Respiratory: normal effort, no wheezes appreciated  CV: appears well perfused, regular rate and rhythm  Abdomen: appropriately tender, incisions clean and dry with dermabond   Skin: warm and dry  Extremities: atraumatic  Neuro: no focal deficits noted  Psych: mood normal     Disposition: home    Patient Instructions:      Medication List        START taking these medications      docusate sodium 100 MG capsule  Commonly known as: COLACE  Take 1 capsule by mouth 2 times daily for 14 days     oxyCODONE-acetaminophen 5-325 MG per tablet  Commonly known as: Percocet  Take 1 tablet by mouth every 6 hours as needed for Pain for up to 5 days. Intended supply: 5 days. Take lowest dose possible to manage pain Max Daily Amount: 4 tablets            CONTINUE taking these medications      ibuprofen 800 MG tablet  Commonly known as: ADVIL;MOTRIN  Take 1 tablet by mouth every 6 hours as needed for Pain            ASK your doctor about these

## 2024-03-15 ENCOUNTER — TELEPHONE (OUTPATIENT)
Dept: BARIATRICS/WEIGHT MGMT | Age: 29
End: 2024-03-15

## 2024-03-15 RX ORDER — ONDANSETRON 4 MG/1
4 TABLET, ORALLY DISINTEGRATING ORAL 3 TIMES DAILY PRN
Qty: 21 TABLET | Refills: 0 | Status: ON HOLD | OUTPATIENT
Start: 2024-03-15

## 2024-03-15 NOTE — TELEPHONE ENCOUNTER
Pt called with p/o n&v from sx requested some medication please advise     Lmvm*4 to ask what pharmacy she would like

## 2024-03-16 ENCOUNTER — HOSPITAL ENCOUNTER (OUTPATIENT)
Age: 29
Setting detail: OBSERVATION
Discharge: HOME | End: 2024-03-20
Attending: STUDENT IN AN ORGANIZED HEALTH CARE EDUCATION/TRAINING PROGRAM

## 2024-03-16 DIAGNOSIS — R11.2 NAUSEA AND VOMITING, UNSPECIFIED VOMITING TYPE: ICD-10-CM

## 2024-03-16 PROBLEM — R10.9 ABDOMINAL PAIN: Status: ACTIVE | Noted: 2024-03-16

## 2024-03-16 PROCEDURE — G0379 DIRECT REFER HOSPITAL OBSERV: HCPCS

## 2024-03-16 PROCEDURE — G0378 HOSPITAL OBSERVATION PER HR: HCPCS

## 2024-03-16 RX ORDER — ONDANSETRON 4 MG/1
4 TABLET, ORALLY DISINTEGRATING ORAL EVERY 8 HOURS PRN
Status: DISCONTINUED | OUTPATIENT
Start: 2024-03-16 | End: 2024-03-17

## 2024-03-16 RX ORDER — MORPHINE SULFATE 2 MG/ML
2 INJECTION, SOLUTION INTRAMUSCULAR; INTRAVENOUS EVERY 4 HOURS PRN
Status: DISCONTINUED | OUTPATIENT
Start: 2024-03-16 | End: 2024-03-17

## 2024-03-16 RX ORDER — SODIUM CHLORIDE 0.9 % (FLUSH) 0.9 %
5-40 SYRINGE (ML) INJECTION EVERY 12 HOURS SCHEDULED
Status: DISCONTINUED | OUTPATIENT
Start: 2024-03-17 | End: 2024-03-20 | Stop reason: HOSPADM

## 2024-03-16 RX ORDER — ENOXAPARIN SODIUM 100 MG/ML
40 INJECTION SUBCUTANEOUS DAILY
Status: DISCONTINUED | OUTPATIENT
Start: 2024-03-17 | End: 2024-03-20 | Stop reason: HOSPADM

## 2024-03-16 RX ORDER — SODIUM CHLORIDE 9 MG/ML
INJECTION, SOLUTION INTRAVENOUS PRN
Status: DISCONTINUED | OUTPATIENT
Start: 2024-03-16 | End: 2024-03-20 | Stop reason: HOSPADM

## 2024-03-16 RX ORDER — POLYETHYLENE GLYCOL 3350 17 G/17G
17 POWDER, FOR SOLUTION ORAL DAILY PRN
Status: DISCONTINUED | OUTPATIENT
Start: 2024-03-16 | End: 2024-03-18

## 2024-03-16 RX ORDER — ONDANSETRON 2 MG/ML
4 INJECTION INTRAMUSCULAR; INTRAVENOUS EVERY 6 HOURS PRN
Status: DISCONTINUED | OUTPATIENT
Start: 2024-03-16 | End: 2024-03-17

## 2024-03-16 RX ORDER — SODIUM CHLORIDE 0.9 % (FLUSH) 0.9 %
5-40 SYRINGE (ML) INJECTION PRN
Status: DISCONTINUED | OUTPATIENT
Start: 2024-03-16 | End: 2024-03-20 | Stop reason: HOSPADM

## 2024-03-16 ASSESSMENT — PAIN SCALES - GENERAL: PAINLEVEL_OUTOF10: 3

## 2024-03-17 LAB
ALBUMIN SERPL-MCNC: 3.8 GM/DL (ref 3.4–5)
ALP BLD-CCNC: 65 IU/L (ref 40–128)
ALT SERPL-CCNC: 28 U/L (ref 10–40)
ANION GAP SERPL CALCULATED.3IONS-SCNC: 10 MMOL/L (ref 7–16)
AST SERPL-CCNC: 9 IU/L (ref 15–37)
BASOPHILS ABSOLUTE: 0 K/CU MM
BASOPHILS RELATIVE PERCENT: 0.2 % (ref 0–1)
BILIRUB SERPL-MCNC: 0.6 MG/DL (ref 0–1)
BUN SERPL-MCNC: 11 MG/DL (ref 6–23)
CALCIUM SERPL-MCNC: 8.5 MG/DL (ref 8.3–10.6)
CHLORIDE BLD-SCNC: 102 MMOL/L (ref 99–110)
CO2: 25 MMOL/L (ref 21–32)
CREAT SERPL-MCNC: 0.9 MG/DL (ref 0.6–1.1)
DIFFERENTIAL TYPE: ABNORMAL
EOSINOPHILS ABSOLUTE: 0.1 K/CU MM
EOSINOPHILS RELATIVE PERCENT: 0.8 % (ref 0–3)
GFR SERPL CREATININE-BSD FRML MDRD: >60 ML/MIN/1.73M2
GLUCOSE SERPL-MCNC: 90 MG/DL (ref 70–99)
HCT VFR BLD CALC: 40.1 % (ref 37–47)
HEMOGLOBIN: 13.3 GM/DL (ref 12.5–16)
IMMATURE NEUTROPHIL %: 0.6 % (ref 0–0.43)
INTERPRETATION: NORMAL
LYMPHOCYTES ABSOLUTE: 4.1 K/CU MM
LYMPHOCYTES RELATIVE PERCENT: 29.4 % (ref 24–44)
MCH RBC QN AUTO: 30.6 PG (ref 27–31)
MCHC RBC AUTO-ENTMCNC: 33.2 % (ref 32–36)
MCV RBC AUTO: 92.4 FL (ref 78–100)
MONOCYTES ABSOLUTE: 0.8 K/CU MM
MONOCYTES RELATIVE PERCENT: 5.8 % (ref 0–4)
NUCLEATED RBC %: 0 %
PDW BLD-RTO: 12.6 % (ref 11.7–14.9)
PLATELET # BLD: 209 K/CU MM (ref 140–440)
PMV BLD AUTO: 11.8 FL (ref 7.5–11.1)
POTASSIUM SERPL-SCNC: 3.9 MMOL/L (ref 3.5–5.1)
PREGNANCY, URINE: NEGATIVE
RBC # BLD: 4.34 M/CU MM (ref 4.2–5.4)
SEGMENTED NEUTROPHILS ABSOLUTE COUNT: 8.8 K/CU MM
SEGMENTED NEUTROPHILS RELATIVE PERCENT: 63.2 % (ref 36–66)
SODIUM BLD-SCNC: 137 MMOL/L (ref 135–145)
TOTAL IMMATURE NEUTOROPHIL: 0.09 K/CU MM
TOTAL NUCLEATED RBC: 0 K/CU MM
TOTAL PROTEIN: 6 GM/DL (ref 6.4–8.2)
WBC # BLD: 14 K/CU MM (ref 4–10.5)

## 2024-03-17 PROCEDURE — 6370000000 HC RX 637 (ALT 250 FOR IP): Performed by: STUDENT IN AN ORGANIZED HEALTH CARE EDUCATION/TRAINING PROGRAM

## 2024-03-17 PROCEDURE — 36415 COLL VENOUS BLD VENIPUNCTURE: CPT

## 2024-03-17 PROCEDURE — 96375 TX/PRO/DX INJ NEW DRUG ADDON: CPT

## 2024-03-17 PROCEDURE — 99024 POSTOP FOLLOW-UP VISIT: CPT | Performed by: NURSE PRACTITIONER

## 2024-03-17 PROCEDURE — 94761 N-INVAS EAR/PLS OXIMETRY MLT: CPT

## 2024-03-17 PROCEDURE — 6360000002 HC RX W HCPCS: Performed by: NURSE PRACTITIONER

## 2024-03-17 PROCEDURE — 99253 IP/OBS CNSLTJ NEW/EST LOW 45: CPT | Performed by: SURGERY

## 2024-03-17 PROCEDURE — 96374 THER/PROPH/DIAG INJ IV PUSH: CPT

## 2024-03-17 PROCEDURE — 96372 THER/PROPH/DIAG INJ SC/IM: CPT

## 2024-03-17 PROCEDURE — 6360000002 HC RX W HCPCS: Performed by: STUDENT IN AN ORGANIZED HEALTH CARE EDUCATION/TRAINING PROGRAM

## 2024-03-17 PROCEDURE — 2580000003 HC RX 258: Performed by: STUDENT IN AN ORGANIZED HEALTH CARE EDUCATION/TRAINING PROGRAM

## 2024-03-17 PROCEDURE — 81025 URINE PREGNANCY TEST: CPT

## 2024-03-17 PROCEDURE — 96361 HYDRATE IV INFUSION ADD-ON: CPT

## 2024-03-17 PROCEDURE — G0378 HOSPITAL OBSERVATION PER HR: HCPCS

## 2024-03-17 PROCEDURE — 80053 COMPREHEN METABOLIC PANEL: CPT

## 2024-03-17 PROCEDURE — 85025 COMPLETE CBC W/AUTO DIFF WBC: CPT

## 2024-03-17 RX ORDER — ONDANSETRON 4 MG/1
4 TABLET, ORALLY DISINTEGRATING ORAL EVERY 8 HOURS PRN
Status: DISCONTINUED | OUTPATIENT
Start: 2024-03-17 | End: 2024-03-20 | Stop reason: HOSPADM

## 2024-03-17 RX ORDER — PROMETHAZINE HYDROCHLORIDE 25 MG/ML
12.5 INJECTION, SOLUTION INTRAMUSCULAR; INTRAVENOUS EVERY 6 HOURS PRN
Status: DISCONTINUED | OUTPATIENT
Start: 2024-03-17 | End: 2024-03-18

## 2024-03-17 RX ORDER — SODIUM CHLORIDE, SODIUM LACTATE, POTASSIUM CHLORIDE, CALCIUM CHLORIDE 600; 310; 30; 20 MG/100ML; MG/100ML; MG/100ML; MG/100ML
INJECTION, SOLUTION INTRAVENOUS CONTINUOUS
Status: DISPENSED | OUTPATIENT
Start: 2024-03-17 | End: 2024-03-17

## 2024-03-17 RX ORDER — CAPSAICIN 0.025 %
CREAM (GRAM) TOPICAL 2 TIMES DAILY
Status: DISCONTINUED | OUTPATIENT
Start: 2024-03-17 | End: 2024-03-20 | Stop reason: HOSPADM

## 2024-03-17 RX ORDER — ONDANSETRON 2 MG/ML
4 INJECTION INTRAMUSCULAR; INTRAVENOUS EVERY 6 HOURS PRN
Status: DISCONTINUED | OUTPATIENT
Start: 2024-03-17 | End: 2024-03-20 | Stop reason: HOSPADM

## 2024-03-17 RX ADMIN — CAPSAICIN: 0.25 CREAM TOPICAL at 01:46

## 2024-03-17 RX ADMIN — SODIUM CHLORIDE, POTASSIUM CHLORIDE, SODIUM LACTATE AND CALCIUM CHLORIDE: 600; 310; 30; 20 INJECTION, SOLUTION INTRAVENOUS at 11:20

## 2024-03-17 RX ADMIN — SODIUM CHLORIDE, POTASSIUM CHLORIDE, SODIUM LACTATE AND CALCIUM CHLORIDE: 600; 310; 30; 20 INJECTION, SOLUTION INTRAVENOUS at 01:09

## 2024-03-17 RX ADMIN — HYDROMORPHONE HYDROCHLORIDE 0.25 MG: 1 INJECTION, SOLUTION INTRAMUSCULAR; INTRAVENOUS; SUBCUTANEOUS at 02:33

## 2024-03-17 RX ADMIN — ENOXAPARIN SODIUM 40 MG: 100 INJECTION SUBCUTANEOUS at 09:43

## 2024-03-17 RX ADMIN — ONDANSETRON 4 MG: 2 INJECTION INTRAMUSCULAR; INTRAVENOUS at 01:27

## 2024-03-17 RX ADMIN — ONDANSETRON 4 MG: 4 TABLET, ORALLY DISINTEGRATING ORAL at 13:15

## 2024-03-17 RX ADMIN — SODIUM CHLORIDE, PRESERVATIVE FREE 10 ML: 5 INJECTION INTRAVENOUS at 21:00

## 2024-03-17 RX ADMIN — MORPHINE SULFATE 2 MG: 2 INJECTION, SOLUTION INTRAMUSCULAR; INTRAVENOUS at 01:28

## 2024-03-17 RX ADMIN — SODIUM CHLORIDE, PRESERVATIVE FREE 10 ML: 5 INJECTION INTRAVENOUS at 02:34

## 2024-03-17 RX ADMIN — CAPSAICIN: 0.25 CREAM TOPICAL at 09:42

## 2024-03-17 ASSESSMENT — PAIN SCALES - GENERAL
PAINLEVEL_OUTOF10: 6
PAINLEVEL_OUTOF10: 0
PAINLEVEL_OUTOF10: 4

## 2024-03-17 ASSESSMENT — PAIN DESCRIPTION - FREQUENCY
FREQUENCY: INTERMITTENT
FREQUENCY: CONTINUOUS

## 2024-03-17 ASSESSMENT — PAIN DESCRIPTION - LOCATION
LOCATION: ABDOMEN
LOCATION: ABDOMEN

## 2024-03-17 ASSESSMENT — PAIN - FUNCTIONAL ASSESSMENT: PAIN_FUNCTIONAL_ASSESSMENT: PREVENTS OR INTERFERES SOME ACTIVE ACTIVITIES AND ADLS

## 2024-03-17 ASSESSMENT — PAIN DESCRIPTION - DESCRIPTORS
DESCRIPTORS: ACHING;SQUEEZING
DESCRIPTORS: CRAMPING

## 2024-03-17 ASSESSMENT — PAIN DESCRIPTION - ONSET
ONSET: ON-GOING
ONSET: ON-GOING

## 2024-03-17 ASSESSMENT — PAIN DESCRIPTION - PAIN TYPE
TYPE: SURGICAL PAIN
TYPE: SURGICAL PAIN

## 2024-03-17 ASSESSMENT — PAIN DESCRIPTION - ORIENTATION: ORIENTATION: RIGHT;LEFT;MID;LOWER;UPPER

## 2024-03-17 NOTE — PLAN OF CARE
Problem: Discharge Planning  Goal: Discharge to home or other facility with appropriate resources  3/17/2024 0937 by Naomi Dixon LPN  Outcome: Progressing  3/16/2024 2346 by Roberto Mcguire, RN  Outcome: Progressing     Problem: Pain  Goal: Verbalizes/displays adequate comfort level or baseline comfort level  3/17/2024 0937 by Naoim Dixon LPN  Outcome: Progressing  3/16/2024 2346 by Roberto Mcguire, RN  Outcome: Progressing     Problem: Safety - Adult  Goal: Free from fall injury  3/17/2024 0937 by Naomi Dixon LPN  Outcome: Progressing  3/16/2024 2346 by Roberto Mcguire, RN  Outcome: Progressing

## 2024-03-17 NOTE — H&P
History and Physical      Name:  Ida Mcclellan /Age/Sex: 1995  (28 y.o. female)   MRN & CSN:  [id number] & [id number] Encounter Date/Time: 3/16/2024 11:45 PM EDT   Location:  [address] [place] [address] PCP: Lux Eisenberg MD       Assessment and Plan:   Ida Mcclellan is a 28 y.o. female with recent laparoscopic cholecystectomy due to symptomatic cholelithiasis presented as a transfer from [facility] due to abdominal pain, nausea and vomiting.    Intractable generalized abdominal pain, nausea and vomiting  Likely secondary to Cannabinoid hyperemesis syndrome vs IBS, doubt post surgical complication  CT abdomen pelvis and RUQ US reports from [facility] reviewed, no acute intraabdominal pathology, s/p cholecystectomy.  IV LR infusion  Pain control and antiemetic as needed   Capsaicin topical cream  In case of refractory s/s can utilize Haldol  Clear liquid diet and advance as tolerated  Surgery consulted from [facility], recommended transfer to [facility], will evaluate in am    Leukocytosis, 18k (13.6k on )  No clinical evidence of infection, likely related to dehydration  Monitor CBC    Marijuana use  Counseled on cessation    Obesity/BMI 33.28  Counseled on dietary modification and weight loss    Observation med surg  Full code    Disposition:     Current Living situation: Home   Expected Disposition: Home  Estimated D/C: 2 days    Diet ADULT DIET; Clear Liquid   DVT Prophylaxis [x] Lovenox, []  Heparin, [] SCDs, [] Ambulation,  [] Eliquis, [] Xarelto, [] Coumadin   Code Status Full Code   Surrogate Decision Maker/ ROBERT Linton     Personally reviewed Lab Studies and Imaging   Discussed management of the case with ER provider who recommended admission for abdominal pain, nausea and vomiting per recommendations from general surgery.    History from:     Patient     History of Present Illness:     Chief Complaint: Abdominal pain, nausea and vomiting    Ida Mcclellan is a 28 y.o. female with recent laparoscopic  appearance.    Peritoneal structures: No evidence of free air or free fluid. No masses. The omentum and small bowel mesentery are normal.    Retroperitoneum: No focal retroperitoneal abnormality is seen.    Abdominal wall:  The visualized portions of the abdominal wall are within normal limits.    CT PELVIS:    Urinary bladder: The urinary bladder is distended with a smooth thin wall. No focal abnormalities are seen. No posterior filling defects are seen on delayed phase imaging.    Soft tissues: No other significant abnormalities in the pelvic structures. No free fluid or lymphadenopathy. The ischiorectal fossa and inguinal regions are normal.    Bones: No significant abnormalities in the bony pelvis.    EXAMINATION: US-RUQ ABD LIMITED     DATE OF EXAM:  3/16/2024 10:07 PM     DEMOGRAPHICS: 28 years old Female     INDICATION:     ruq pain     Reason for Exam:  ruq pain.     COMPARISON: CT abdomen and pelvis 3/16/2024     TECHNIQUE: Sonographic evaluation of the abdomen was performed. Evaluation was targeted in the right upper quadrant.     FINDINGS:    AORTA: The visualized abdominal aorta is normal.       IVC: The visualized IVC is normal.       PANCREAS: Obscured by overlying bowel gas.       LIVER:   Liver Length: 15.26 cm   Partially obscured by overlying rib shadowing. Otherwise the imaged liver is normal in size and contour. No focal abnormalities are seen.          Gallbladder:   Absent.      Biliary Tree:   CBD:  2.9 mm   No choledocholithiasis or obstructive lesion.       RIGHT KIDNEY:   Length: 11.7 cm   Height: 4.31 cm   Width:  4.05 cm   Partially obscured by overlying rib shadowing. Otherwise there is preservation of normal size, contour, and echogenicity. There is preservation of cortical thickness and corticomedullary differentiation. There is no hydronephrosis or echogenic calculi.      There is no evidence for ascites.       Electronically signed by Toñito Fleming MD on 3/17/2024 at 2:11  AM

## 2024-03-17 NOTE — CONSULTS
Consult Note  [facility] - General Surgery    Patient ID:  Name:Ida Mcclellan  Date: 3/17/2024 12:01 PM   MRN#: [id number] :1995   Admission Date:3/16/2024 Age/Sex:28 y.o. female     Date: 3/17/24    Reason for Evaluation:   Abdominal pain s/p lap cholecystectomy     Requesting Physician: Monica Hou APRN*    CHIEF COMPLAINT:   Abdominal pain, N/V    History Obtained From:  patient, electronic medical record    HISTORY OF PRESENT ILLNESS:    Ida Mcclellan is a 28 y.o. female presenting with abdominal pain, N/V s/p laparoscopic cholecystectomy on 3/12.  LFT's normal    Location: generalized  Quality, Severity: moderate  Pain is described as sharp  Timing, Duration: started Wednesday  Context, Modifying Factors: patient states she has had intermittent abdominal pain with vomiting for a couple years.  They thought it may have been her gallbladder but feels like the symptoms are still there after surgery  Associated Signs & Symptoms: nausea and vomiting  Denies:  fever, chills, constipation, diarrhea      Past Medical History:    Past Medical History:   Diagnosis Date    Thyroid disease        Past Surgical History:    Past Surgical History:   Procedure Laterality Date    CHOLECYSTECTOMY, LAPAROSCOPIC N/A 3/12/2024    CHOLECYSTECTOMY LAPAROSCOPIC performed by Herrera Weston MD at [facility] OR       Family History:   No family history on file.    REVIEW OF SYSTEMS:    Pertinent items noted in HPI    PHYSICAL EXAM:    Physical Exam  Constitutional:       General: She is not in acute distress.     Appearance: She is well-developed.   Eyes:      General: No scleral icterus.     Conjunctiva/sclera: Conjunctivae normal.   Cardiovascular:      Rate and Rhythm: Normal rate and regular rhythm.      Heart sounds: Normal heart sounds. No murmur heard.  Pulmonary:      Effort: Pulmonary effort is normal. No respiratory distress.      Breath sounds: Normal breath sounds. No wheezing.   Abdominal:       General: There is no distension.      Palpations: Abdomen is soft.      Tenderness: There is abdominal tenderness.   Musculoskeletal:         General: Normal range of motion.   Skin:     General: Skin is warm.   Neurological:      General: No focal deficit present.      Mental Status: She is alert.   Psychiatric:         Mood and Affect: Mood normal.        Imaging:   Pertinent laboratory and imaging studies were personally reviewed if available.    IMPRESSION:    Ida Mcclellan is a 28 y.o. female with abdominal pain, N/V s/p laparoscopic cholecystectomy    Patient Active Problem List    Diagnosis Date Noted    Abdominal pain 03/16/2024    Symptomatic cholelithiasis 03/12/2024       PLAN:  Discussed options and plan with Ida Mcclellan  Patient presented with abdominal pain, nausea and vomiting s/p laparoscopic cholecystectomy.  States she started vomiting the day after surgery.   VSS and LFTs not elevated.  Chronic nausea and vomiting prior to surgery, would recommend GI consult (may be done OP).  She does admit to marijuana use so could have hyperemesis cannabis.   Thank you for the consultation and the opportunity to care for Ida Mcclellan    Patient and plan of care discussed with Kianna Louis MD.    Electronically signed by KASHMIR Butterfield - CNP, 3/17/2024, 12:01 PM

## 2024-03-17 NOTE — PROGRESS NOTES
4 Eyes Skin Assessment     NAME:  Ida Mcclellan  YOB: 1995  MEDICAL RECORD NUMBER:  [id number]    The patient is being assess for  Admission    I agree that 2 RN's have performed a thorough Head to Toe Skin Assessment on the patient. ALL assessment sites listed below have been assessed.      Areas assessed by both nurses:    Head, Face, Ears, Shoulders, Back, Chest, Arms, Elbows, Hands, Sacrum. Buttock, Coccyx, Ischium, Legs. Feet and Heels, and Under Medical Devices   - 4 lap sites abdomen s/p lap joelle  - scattered bruising abdomen        Does the Patient have a Wound? No noted wound(s)       Yehuda Prevention initiated:  NA   Wound Care Orders initiated:  NA    Pressure Injury (Stage 3,4, Unstageable, DTI, NWPT, and Complex wounds) if present place consult order under :: NA    New and Established Ostomies if present place consult order under : NA      Nurse 1 eSignature: Electronically signed by Roberto Mcguire RN on 3/16/24 at 11:49 PM EDT    **SHARE this note so that the co-signing nurse is able to place an eSignature**    Nurse 2 eSignature: Electronically signed by Savana Hernandez LPN on 3/17/24 at 12:02 AM EDT

## 2024-03-17 NOTE — PROGRESS NOTES
V2.0  [facility] [place]      Name:  Ida Mcclellan /Age/Sex: 1995  (28 y.o. female)   MRN & CSN:  [id number] & [id number] Encounter Date/Time: 3/17/2024 7:37 AM EDT    [facility]  [facility] PCP: Lux Eisenberg MD       Hospital Day: 2    Assessment and Plan:   Ida Mcclellan is a 28 y.o. female with pmh of  recent laparoscopic cholecystectomy due to symptomatic cholelithiasis presented as a transfer from [facility] due to abdominal pain, nausea and vomiting, who presents with Abdominal pain      Plan:    Intractable generalized abdominal pain, nausea and vomiting- improving  Likely secondary to Cannabinoid hyperemesis syndrome vs IBS, doubt post surgical complication  CT abdomen pelvis and RUQ US reports from [facility] reviewed, no acute intraabdominal pathology, s/p cholecystectomy.  IV LR infusion  Pain control and antiemetic as needed   Capsaicin topical cream  In case of refractory s/s can utilize Haldol  Clear liquid diet and advance as tolerated  Surgery consulted from [facility], recommended transfer to [facility], will evaluate in am     Leukocytosis, 18k (14.0 on 3/17) - improving   No clinical evidence of infection, likely related to dehydration  Monitor CBC     Marijuana use  Counseled on cessation     Obesity/BMI 33.28  Counseled on dietary modification and weight loss    Diet ADULT DIET; Regular; GI [place] (GERD/Peptic Ulcer); LOW FAT   DVT Prophylaxis [] Lovenox, []  Heparin, [] SCDs, [] Ambulation,  [] Eliquis, [] Xarelto  [] Coumadin   Code Status Full Code   Disposition From: Home  Expected Disposition: Home  Estimated Date of Discharge: 1-2 days   Patient requires continued admission due to persistent symptoms of N&V   Surrogate Decision Maker/ ROBERT Mcclellan- Spouse      Personally reviewed Lab Studies and Imaging     Discussed management of the case with Dr. John Cunningham.          Subjective:     Chief Complaint: N&V, Abdominal Pain      Ida Mcclellan is a 28 y.o. female  sodium chloride       PRN Meds: promethazine, 12.5 mg, Q6H PRN  ondansetron, 4 mg, Q8H PRN   Or  ondansetron, 4 mg, Q6H PRN  sodium chloride flush, 5-40 mL, PRN  sodium chloride, , PRN  polyethylene glycol, 17 g, Daily PRN        Labs      Recent Results (from the past 24 hour(s))   Comprehensive Metabolic Panel w/ Reflex to MG    Collection Time: 03/17/24  4:18 AM   Result Value Ref Range    Sodium 137 135 - 145 MMOL/L    Potassium 3.9 3.5 - 5.1 MMOL/L    Chloride 102 99 - 110 mMol/L    CO2 25 21 - 32 MMOL/L    Anion Gap 10 7 - 16    Glucose 90 70 - 99 MG/DL    BUN 11 6 - 23 MG/DL    Creatinine 0.9 0.6 - 1.1 MG/DL    Est, Glom Filt Rate >60 >60 mL/min/1.73m2    Calcium 8.5 8.3 - 10.6 MG/DL    Total Protein 6.0 (L) 6.4 - 8.2 GM/DL    Albumin 3.8 3.4 - 5.0 GM/DL    Total Bilirubin 0.6 0.0 - 1.0 MG/DL    Alkaline Phosphatase 65 40 - 128 IU/L    ALT 28 10 - 40 U/L    AST 9 (L) 15 - 37 IU/L   CBC with Auto Differential    Collection Time: 03/17/24  4:18 AM   Result Value Ref Range    WBC 14.0 (H) 4.0 - 10.5 K/CU MM    RBC 4.34 4.2 - 5.4 M/CU MM    Hemoglobin 13.3 12.5 - 16.0 GM/DL    Hematocrit 40.1 37 - 47 %    MCV 92.4 78 - 100 FL    MCH 30.6 27 - 31 PG    MCHC 33.2 32.0 - 36.0 %    RDW 12.6 11.7 - 14.9 %    Platelets 209 140 - 440 K/CU MM    MPV 11.8 (H) 7.5 - 11.1 FL    Differential Type AUTOMATED DIFFERENTIAL     Segs Relative 63.2 36 - 66 %    Lymphocytes % 29.4 24 - 44 %    Monocytes % 5.8 (H) 0 - 4 %    Eosinophils % 0.8 0 - 3 %    Basophils % 0.2 0 - 1 %    Segs Absolute 8.8 K/CU MM    Lymphocytes Absolute 4.1 K/CU MM    Monocytes Absolute 0.8 K/CU MM    Eosinophils Absolute 0.1 K/CU MM    Basophils Absolute 0.0 K/CU MM    Nucleated RBC % 0.0 %    Total Nucleated RBC 0.0 K/CU MM    Total Immature Neutrophil 0.09 K/CU MM    Immature Neutrophil % 0.6 (H) 0 - 0.43 %   Pregnancy, urine    Collection Time: 03/17/24 10:12 AM   Result Value Ref Range    Pregnancy, Urine NEGATIVE NEGATIVE    Interpretation       HCG

## 2024-03-18 LAB
ALBUMIN SERPL-MCNC: 3.8 GM/DL (ref 3.4–5)
ALP BLD-CCNC: 66 IU/L (ref 40–129)
ALT SERPL-CCNC: 24 U/L (ref 10–40)
ANION GAP SERPL CALCULATED.3IONS-SCNC: 11 MMOL/L (ref 7–16)
AST SERPL-CCNC: 10 IU/L (ref 15–37)
BASOPHILS ABSOLUTE: 0 K/CU MM
BASOPHILS RELATIVE PERCENT: 0.3 % (ref 0–1)
BILIRUB SERPL-MCNC: 0.3 MG/DL (ref 0–1)
BUN SERPL-MCNC: 9 MG/DL (ref 6–23)
CALCIUM SERPL-MCNC: 8.8 MG/DL (ref 8.3–10.6)
CHLORIDE BLD-SCNC: 106 MMOL/L (ref 99–110)
CO2: 26 MMOL/L (ref 21–32)
CREAT SERPL-MCNC: 0.9 MG/DL (ref 0.6–1.1)
DIFFERENTIAL TYPE: ABNORMAL
EOSINOPHILS ABSOLUTE: 0.1 K/CU MM
EOSINOPHILS RELATIVE PERCENT: 0.9 % (ref 0–3)
GFR SERPL CREATININE-BSD FRML MDRD: >60 ML/MIN/1.73M2
GLUCOSE SERPL-MCNC: 119 MG/DL (ref 70–99)
HCT VFR BLD CALC: 41.1 % (ref 37–47)
HEMOGLOBIN: 13.2 GM/DL (ref 12.5–16)
IMMATURE NEUTROPHIL %: 0.5 % (ref 0–0.43)
LYMPHOCYTES ABSOLUTE: 2.1 K/CU MM
LYMPHOCYTES RELATIVE PERCENT: 23.3 % (ref 24–44)
MCH RBC QN AUTO: 30.6 PG (ref 27–31)
MCHC RBC AUTO-ENTMCNC: 32.1 % (ref 32–36)
MCV RBC AUTO: 95.4 FL (ref 78–100)
MONOCYTES ABSOLUTE: 0.5 K/CU MM
MONOCYTES RELATIVE PERCENT: 5.6 % (ref 0–4)
NUCLEATED RBC %: 0 %
PDW BLD-RTO: 12.8 % (ref 11.7–14.9)
PLATELET # BLD: 189 K/CU MM (ref 140–440)
PMV BLD AUTO: 11.3 FL (ref 7.5–11.1)
POTASSIUM SERPL-SCNC: 4.4 MMOL/L (ref 3.5–5.1)
RBC # BLD: 4.31 M/CU MM (ref 4.2–5.4)
SEGMENTED NEUTROPHILS ABSOLUTE COUNT: 6.1 K/CU MM
SEGMENTED NEUTROPHILS RELATIVE PERCENT: 69.4 % (ref 36–66)
SODIUM BLD-SCNC: 143 MMOL/L (ref 135–145)
TOTAL IMMATURE NEUTOROPHIL: 0.04 K/CU MM
TOTAL NUCLEATED RBC: 0 K/CU MM
TOTAL PROTEIN: 6.1 GM/DL (ref 6.4–8.2)
WBC # BLD: 8.8 K/CU MM (ref 4–10.5)

## 2024-03-18 PROCEDURE — 36415 COLL VENOUS BLD VENIPUNCTURE: CPT

## 2024-03-18 PROCEDURE — 6360000002 HC RX W HCPCS: Performed by: NURSE PRACTITIONER

## 2024-03-18 PROCEDURE — 96376 TX/PRO/DX INJ SAME DRUG ADON: CPT

## 2024-03-18 PROCEDURE — 94761 N-INVAS EAR/PLS OXIMETRY MLT: CPT

## 2024-03-18 PROCEDURE — 96375 TX/PRO/DX INJ NEW DRUG ADDON: CPT

## 2024-03-18 PROCEDURE — G0378 HOSPITAL OBSERVATION PER HR: HCPCS

## 2024-03-18 PROCEDURE — 6370000000 HC RX 637 (ALT 250 FOR IP): Performed by: STUDENT IN AN ORGANIZED HEALTH CARE EDUCATION/TRAINING PROGRAM

## 2024-03-18 PROCEDURE — 2580000003 HC RX 258: Performed by: SPECIALIST

## 2024-03-18 PROCEDURE — 6360000002 HC RX W HCPCS: Performed by: STUDENT IN AN ORGANIZED HEALTH CARE EDUCATION/TRAINING PROGRAM

## 2024-03-18 PROCEDURE — 96372 THER/PROPH/DIAG INJ SC/IM: CPT

## 2024-03-18 PROCEDURE — 80053 COMPREHEN METABOLIC PANEL: CPT

## 2024-03-18 PROCEDURE — 6370000000 HC RX 637 (ALT 250 FOR IP): Performed by: NURSE PRACTITIONER

## 2024-03-18 PROCEDURE — 96361 HYDRATE IV INFUSION ADD-ON: CPT

## 2024-03-18 PROCEDURE — 85025 COMPLETE CBC W/AUTO DIFF WBC: CPT

## 2024-03-18 PROCEDURE — 2580000003 HC RX 258: Performed by: STUDENT IN AN ORGANIZED HEALTH CARE EDUCATION/TRAINING PROGRAM

## 2024-03-18 PROCEDURE — 99024 POSTOP FOLLOW-UP VISIT: CPT | Performed by: SURGERY

## 2024-03-18 PROCEDURE — C9113 INJ PANTOPRAZOLE SODIUM, VIA: HCPCS | Performed by: NURSE PRACTITIONER

## 2024-03-18 RX ORDER — POLYETHYLENE GLYCOL 3350 17 G/17G
17 POWDER, FOR SOLUTION ORAL 2 TIMES DAILY
Status: DISCONTINUED | OUTPATIENT
Start: 2024-03-18 | End: 2024-03-20 | Stop reason: HOSPADM

## 2024-03-18 RX ORDER — MORPHINE SULFATE 2 MG/ML
2 INJECTION, SOLUTION INTRAMUSCULAR; INTRAVENOUS EVERY 4 HOURS PRN
Status: DISCONTINUED | OUTPATIENT
Start: 2024-03-18 | End: 2024-03-18

## 2024-03-18 RX ORDER — SODIUM CHLORIDE, SODIUM LACTATE, POTASSIUM CHLORIDE, CALCIUM CHLORIDE 600; 310; 30; 20 MG/100ML; MG/100ML; MG/100ML; MG/100ML
INJECTION, SOLUTION INTRAVENOUS CONTINUOUS
Status: DISCONTINUED | OUTPATIENT
Start: 2024-03-18 | End: 2024-03-20

## 2024-03-18 RX ORDER — PROMETHAZINE HYDROCHLORIDE 25 MG/ML
25 INJECTION, SOLUTION INTRAMUSCULAR; INTRAVENOUS EVERY 6 HOURS PRN
Status: DISCONTINUED | OUTPATIENT
Start: 2024-03-18 | End: 2024-03-18

## 2024-03-18 RX ORDER — MORPHINE SULFATE 2 MG/ML
1 INJECTION, SOLUTION INTRAMUSCULAR; INTRAVENOUS EVERY 4 HOURS PRN
Status: DISCONTINUED | OUTPATIENT
Start: 2024-03-18 | End: 2024-03-18

## 2024-03-18 RX ORDER — PANTOPRAZOLE SODIUM 40 MG/1
40 TABLET, DELAYED RELEASE ORAL
Status: DISCONTINUED | OUTPATIENT
Start: 2024-03-19 | End: 2024-03-18

## 2024-03-18 RX ORDER — PANTOPRAZOLE SODIUM 40 MG/10ML
40 INJECTION, POWDER, LYOPHILIZED, FOR SOLUTION INTRAVENOUS 2 TIMES DAILY
Status: DISCONTINUED | OUTPATIENT
Start: 2024-03-18 | End: 2024-03-20 | Stop reason: HOSPADM

## 2024-03-18 RX ORDER — CALCIUM CARBONATE 500 MG/1
1000 TABLET, CHEWABLE ORAL 3 TIMES DAILY PRN
Status: DISCONTINUED | OUTPATIENT
Start: 2024-03-18 | End: 2024-03-20 | Stop reason: HOSPADM

## 2024-03-18 RX ORDER — FAMOTIDINE 20 MG/1
20 TABLET, FILM COATED ORAL 2 TIMES DAILY
Status: DISCONTINUED | OUTPATIENT
Start: 2024-03-18 | End: 2024-03-18

## 2024-03-18 RX ORDER — PANTOPRAZOLE SODIUM 40 MG/10ML
40 INJECTION, POWDER, LYOPHILIZED, FOR SOLUTION INTRAVENOUS ONCE
Status: DISCONTINUED | OUTPATIENT
Start: 2024-03-18 | End: 2024-03-18

## 2024-03-18 RX ORDER — DROPERIDOL 2.5 MG/ML
1.25 INJECTION, SOLUTION INTRAMUSCULAR; INTRAVENOUS EVERY 6 HOURS PRN
Status: DISCONTINUED | OUTPATIENT
Start: 2024-03-18 | End: 2024-03-20 | Stop reason: HOSPADM

## 2024-03-18 RX ADMIN — FAMOTIDINE 20 MG: 20 TABLET ORAL at 09:13

## 2024-03-18 RX ADMIN — PROMETHAZINE HYDROCHLORIDE 12.5 MG: 25 INJECTION INTRAMUSCULAR; INTRAVENOUS at 12:39

## 2024-03-18 RX ADMIN — SODIUM CHLORIDE, PRESERVATIVE FREE 10 ML: 5 INJECTION INTRAVENOUS at 21:17

## 2024-03-18 RX ADMIN — SODIUM CHLORIDE, PRESERVATIVE FREE 10 ML: 5 INJECTION INTRAVENOUS at 08:58

## 2024-03-18 RX ADMIN — HYDROMORPHONE HYDROCHLORIDE 1 MG: 1 INJECTION, SOLUTION INTRAMUSCULAR; INTRAVENOUS; SUBCUTANEOUS at 15:47

## 2024-03-18 RX ADMIN — SODIUM CHLORIDE, POTASSIUM CHLORIDE, SODIUM LACTATE AND CALCIUM CHLORIDE: 600; 310; 30; 20 INJECTION, SOLUTION INTRAVENOUS at 15:06

## 2024-03-18 RX ADMIN — MORPHINE SULFATE 2 MG: 2 INJECTION, SOLUTION INTRAMUSCULAR; INTRAVENOUS at 15:00

## 2024-03-18 RX ADMIN — SODIUM CHLORIDE, POTASSIUM CHLORIDE, SODIUM LACTATE AND CALCIUM CHLORIDE: 600; 310; 30; 20 INJECTION, SOLUTION INTRAVENOUS at 17:37

## 2024-03-18 RX ADMIN — DROPERIDOL 1.25 MG: 2.5 INJECTION, SOLUTION INTRAMUSCULAR; INTRAVENOUS at 15:49

## 2024-03-18 RX ADMIN — ENOXAPARIN SODIUM 40 MG: 100 INJECTION SUBCUTANEOUS at 08:58

## 2024-03-18 RX ADMIN — PANTOPRAZOLE SODIUM 40 MG: 40 INJECTION, POWDER, FOR SOLUTION INTRAVENOUS at 21:14

## 2024-03-18 RX ADMIN — ONDANSETRON 4 MG: 4 TABLET, ORALLY DISINTEGRATING ORAL at 11:29

## 2024-03-18 RX ADMIN — POLYETHYLENE GLYCOL (3350) 17 G: 17 POWDER, FOR SOLUTION ORAL at 11:29

## 2024-03-18 RX ADMIN — PANTOPRAZOLE SODIUM 40 MG: 40 INJECTION, POWDER, FOR SOLUTION INTRAVENOUS at 15:03

## 2024-03-18 ASSESSMENT — PAIN SCALES - WONG BAKER
WONGBAKER_NUMERICALRESPONSE: NO HURT
WONGBAKER_NUMERICALRESPONSE: HURTS WORST
WONGBAKER_NUMERICALRESPONSE: NO HURT

## 2024-03-18 ASSESSMENT — PAIN DESCRIPTION - FREQUENCY
FREQUENCY: CONTINUOUS
FREQUENCY: CONTINUOUS

## 2024-03-18 ASSESSMENT — PAIN DESCRIPTION - ORIENTATION
ORIENTATION: MID
ORIENTATION: MID;UPPER

## 2024-03-18 ASSESSMENT — PAIN SCALES - GENERAL
PAINLEVEL_OUTOF10: 8
PAINLEVEL_OUTOF10: 8

## 2024-03-18 ASSESSMENT — PAIN DESCRIPTION - DESCRIPTORS
DESCRIPTORS: CRAMPING;DISCOMFORT;GNAWING
DESCRIPTORS: CRAMPING;THROBBING

## 2024-03-18 ASSESSMENT — PAIN - FUNCTIONAL ASSESSMENT
PAIN_FUNCTIONAL_ASSESSMENT: ACTIVITIES ARE NOT PREVENTED
PAIN_FUNCTIONAL_ASSESSMENT: ACTIVITIES ARE NOT PREVENTED

## 2024-03-18 ASSESSMENT — PAIN DESCRIPTION - ONSET
ONSET: PROGRESSIVE
ONSET: PROGRESSIVE

## 2024-03-18 ASSESSMENT — PAIN DESCRIPTION - PAIN TYPE
TYPE: SURGICAL PAIN;ACUTE PAIN
TYPE: SURGICAL PAIN;ACUTE PAIN

## 2024-03-18 ASSESSMENT — PAIN DESCRIPTION - LOCATION
LOCATION: ABDOMEN
LOCATION: ABDOMEN

## 2024-03-18 NOTE — PROGRESS NOTES
Comprehensive Nutrition Assessment    Type and Reason for Visit:  Initial, Positive Nutrition Screen (wt loss , poor po intake)    Nutrition Recommendations/Plan:   Continue current diet  Begin low prachi high protein oral nutrition supplement tid, between meals     Malnutrition Assessment:  Malnutrition Status:  At risk for malnutrition  (03/18/24 1035)    Context:  Acute Illness       Nutrition Assessment:    Pt admitted with dyspepsia, intractable generalized abdominal pain, nausea and vomiting s/p recent laparoscopic cholecystectomy. H/O thyroid disease. She is eating some cereal and skim milk for breakfast, reports improved symptoms and intake. No recent significant wt loss noted, usual wt ~200 lb per stated history. Reviewed importance of eating slowly, chewing thoroughly to pureed consistency, bland, low fat foods. Pt willing to trial oral supplements, will order tid. Continue to follow as high nutrition risk.    Nutrition Related Findings:    Glu 119   Wound Type: Surgical Incision       Current Nutrition Intake & Therapies:    Average Meal Intake: 1-25%, 26-50%  Average Supplements Intake: None Ordered  ADULT DIET; Regular; GI [place] (GERD/Peptic Ulcer); LOW FAT    Anthropometric Measures:  Height: 165.1 cm (5' 5\")  Ideal Body Weight (IBW): 125 lbs (57 kg)    Admission Body Weight: 90.6 kg (199 lb 11.8 oz)  Current Body Weight: 90.6 kg (199 lb 11.8 oz),   IBW.    Current BMI (kg/m2): 33.2  Usual Body Weight: 90.7 kg (200 lb)  % Weight Change (Calculated): -0.1                    BMI Categories: Obese Class 1 (BMI 30.0-34.9)    Estimated Daily Nutrient Needs:  Energy Requirements Based On: Kcal/kg  Weight Used for Energy Requirements: Ideal  Energy (kcal/day): 4350-0418 (25-30 kcal/kg IBW)  Weight Used for Protein Requirements: Ideal  Protein (g/day): 57-68 (1-1.2 g/kg IBW)     Fluid (ml/day):  1427-2701    Nutrition Diagnosis:   Inadequate protein-energy intake related to altered GI function, acute

## 2024-03-18 NOTE — PROGRESS NOTES
V2.0  [facility] [place] Hospitalist Progress Note      Name:  Ida Mcclellan /Age/Sex: 1995  (28 y.o. female)   MRN & CSN:  [id number] & [id number] Encounter Date/Time: 3/18/2024 7:37 AM EDT    Location:  [address] [place] [address]-A PCP: Lux Eisenberg MD       Hospital Day: 3    Assessment and Plan:   Ida Mcclellan is a 28 y.o. female with pmh of  recent laparoscopic cholecystectomy due to symptomatic cholelithiasis presented as a transfer from [facility] due to abdominal pain, nausea and vomiting, who presents with Abdominal pain      Plan:    Dyspepsia with N&V   Intractable generalized abdominal pain, nausea and vomiting- improved   S/p Lap Chandni 3/12/24   Likely secondary to Cannabinoid hyperemesis syndrome vs IBS, doubt post surgical complication  CT abdomen pelvis and RUQ US reports from [facility] reviewed, no acute intraabdominal pathology, s/p cholecystectomy on 3/12/24 with Dr. Weston   IV LR infusion  Pain control and antiemetic as needed, will add droperidol Q6 prn for possible Hyperemesis Cannabis   Capsaicin topical cream  In case of refractory s/s can utilize Haldol  Clear liquid diet and advance as tolerated  Surgery consulted from [facility], recommended transfer to [facility], will evaluate in am  Start Pepcid 20 mg BID and Tums for indigestion and acid reflux 3/18   Was seen by General Surgery, they recommend GI Eval  GI Consulted: Dr. uDran to see      Leukocytosis, 18k (14.0 on 3/17) - resolved  No clinical evidence of infection, likely related to dehydration  Monitor CBC  WBC 8.8 today      Marijuana use  Counseled on cessation     Obesity/BMI 33.28  Counseled on dietary modification and weight loss    Diet ADULT DIET; Regular; GI [place] (GERD/Peptic Ulcer); LOW FAT  ADULT ORAL NUTRITION SUPPLEMENT; Breakfast, Lunch, Dinner; Low Calorie/High Protein Oral Supplement   DVT Prophylaxis [] Lovenox, []  Heparin, [] SCDs, [] Ambulation,  [] Eliquis, [] Xarelto  [] Coumadin   Code Status Full Code   Disposition From:  Home  Expected Disposition: Home  Estimated Date of Discharge: 1 days   Patient requires continued admission due to persistent symptoms of N&V   Surrogate Decision Maker/ POA Royer Mcclellan- Spouse      Personally reviewed Lab Studies and Imaging     Discussed management of the case with Dr. John Cunningham.          Subjective:     Chief Complaint: N&V, Abdominal Pain      Ida Mcclellan is a 28 y.o. female who presented  with recent laparoscopic cholecystectomy due to symptomatic cholelithiasis [facility], nausea and vomiting.  Patient reports that over the past 1 and half year, she has been dealing with generalized abdominal pain, associated with multiple episodes of nausea and nonbloody nonbilious emesis.  This pattern is cyclical in nature, so far she has been unable to find any aggravating or alleviating factors.  Patient was admitted at [facility] from 3/11/2024 until 3/12/2024 and underwent laparoscopic cholecystectomy due to symptomatic cholelithiasis.  She mentions that her symptoms have stayed absolutely same even after the surgery.  During my evaluation patient was alert oriented x 3 hemodynamically stable.  Patient states that her mouth is so dry she would like to drink something.      On exam today her symptoms have improved- no vomiting since 3/16 PM. Has been able to eat soft bland foods without vomiting. Is having indigestion and stomach pain as soon as her food goes down. Tried her mom's Tums and it did help.     RN notified me afternoon 3/18 that N&V recurred again x 4 hours     Review of Systems:      Pertinent positives and negatives discussed in HPI    Objective:   No intake or output data in the 24 hours ending 03/18/24 1429     Vitals:   Vitals:    03/18/24 0857   BP: 118/72   Pulse: 68   Resp: 16   Temp: 97.2 °F (36.2 °C)   SpO2: 98%       Physical Exam:     General: NAD  Eyes: EOMI  ENT: neck supple  Cardiovascular: Regular rate and rhythm, no

## 2024-03-18 NOTE — CARE COORDINATION
Chart reviewed and patient discussed in IDR. From home, independent. PCP listed. Patient is self-pay, referral to [facility]. No other needs at this time. CM available should any new needs arise.

## 2024-03-18 NOTE — PLAN OF CARE
Problem: Discharge Planning  Goal: Discharge to home or other facility with appropriate resources  3/18/2024 1019 by Inga Regalado RN  Outcome: Progressing  3/17/2024 2212 by Heike Kim RN  Outcome: Progressing     Problem: Pain  Goal: Verbalizes/displays adequate comfort level or baseline comfort level  3/18/2024 1019 by Inga Regalado RN  Outcome: Progressing  3/17/2024 2212 by Heike Kim RN  Outcome: Progressing     Problem: Safety - Adult  Goal: Free from fall injury  3/18/2024 1019 by Inga Regalado RN  Outcome: Progressing  3/17/2024 2212 by Heike Kim RN  Outcome: Progressing     Problem: Nutrition Deficit:  Goal: Optimize nutritional status  Outcome: Progressing     Problem: Gastrointestinal - Adult  Goal: Minimal or absence of nausea and vomiting  Outcome: Progressing  Goal: Maintains or returns to baseline bowel function  Outcome: Progressing  Goal: Maintains adequate nutritional intake  Outcome: Progressing

## 2024-03-18 NOTE — PROGRESS NOTES
GENERAL SURGERY PROGRESS NOTE    Ida Mcclellan is a 28 y.o. female s/p lap cholecystectomy last week. Admitted with persistent epigastric pain and nausea.                 Subjective:  Doing ok this AM. Nausea improved.  Reports persistent epigastric pain. Denies other complaints. WBC improved.  She asks about possible EGD to rule out other etiologies of her symptoms.    Objective:    Vitals: VITALS:  /72   Pulse 68   Temp 97.2 °F (36.2 °C) (Axillary)   Resp 16   Ht 1.651 m (5' 5\")   Wt 90.6 kg (199 lb 11.8 oz)   LMP 03/04/2024 (Approximate)   SpO2 98%   BMI 33.24 kg/m²     I/O: No intake/output data recorded.    Labs/Imaging Results:   Lab Results   Component Value Date/Time     03/18/2024 08:42 AM    K 4.4 03/18/2024 08:42 AM     03/18/2024 08:42 AM    CO2 26 03/18/2024 08:42 AM    BUN 9 03/18/2024 08:42 AM    CREATININE 0.9 03/18/2024 08:42 AM    GLUCOSE 119 03/18/2024 08:42 AM    CALCIUM 8.8 03/18/2024 08:42 AM      Lab Results   Component Value Date    WBC 8.8 03/18/2024    HGB 13.2 03/18/2024    HCT 41.1 03/18/2024    MCV 95.4 03/18/2024     03/18/2024       IV Fluids:   sodium chloride    Scheduled Meds:   famotidine, 20 mg, Oral, BID    polyethylene glycol, 17 g, Oral, BID    capsaicin, , Topical, BID    sodium chloride flush, 5-40 mL, IntraVENous, 2 times per day    enoxaparin, 40 mg, SubCUTAneous, Daily    Physical Exam:  General: A&O x 3, no distress.   HEENT: Anicteric sclerae, MMM.  Extremities: No edema bilat LE.  Abdomen: Soft, mildly tender in the epigastrium. Incision sites clean and dry with dermabond.      Assessment and Plan:  28 y.o. female s/p laparoscopic cholecystectomy last week.  Admitted with persistent nausea and epigastric pain.  Symptoms similar to prior to cholecystectomy.  I suspect her symptoms have been due to another etiology all along.  Possible gastritis or PUD.    Patient Active Problem List:     Symptomatic cholelithiasis     Abdominal  pain      - agree with famotidine therapy  - will ask GI to evaluate, patient requested evaluation for EGD  - ok to discharge from a surgical perspective if GI feels inpatient EGD is not warranted  - will follow    Herrera Weston MD

## 2024-03-19 ENCOUNTER — ANESTHESIA (OUTPATIENT)
Dept: ENDOSCOPY | Age: 29
End: 2024-03-19

## 2024-03-19 ENCOUNTER — ANESTHESIA EVENT (OUTPATIENT)
Dept: ENDOSCOPY | Age: 29
End: 2024-03-19

## 2024-03-19 LAB
ALBUMIN SERPL-MCNC: 3.6 GM/DL (ref 3.4–5)
ALP BLD-CCNC: 60 IU/L (ref 40–129)
ALT SERPL-CCNC: 21 U/L (ref 10–40)
ANION GAP SERPL CALCULATED.3IONS-SCNC: 11 MMOL/L (ref 7–16)
APTT: 22.2 SECONDS (ref 25.1–37.1)
AST SERPL-CCNC: 12 IU/L (ref 15–37)
BASOPHILS ABSOLUTE: 0 K/CU MM
BASOPHILS RELATIVE PERCENT: 0.4 % (ref 0–1)
BILIRUB SERPL-MCNC: 0.2 MG/DL (ref 0–1)
BUN SERPL-MCNC: 8 MG/DL (ref 6–23)
CALCIUM SERPL-MCNC: 8.2 MG/DL (ref 8.3–10.6)
CHLORIDE BLD-SCNC: 107 MMOL/L (ref 99–110)
CO2: 22 MMOL/L (ref 21–32)
CREAT SERPL-MCNC: 0.8 MG/DL (ref 0.6–1.1)
DIFFERENTIAL TYPE: ABNORMAL
EOSINOPHILS ABSOLUTE: 0.1 K/CU MM
EOSINOPHILS RELATIVE PERCENT: 1.1 % (ref 0–3)
GFR SERPL CREATININE-BSD FRML MDRD: >60 ML/MIN/1.73M2
GLUCOSE SERPL-MCNC: 104 MG/DL (ref 70–99)
HCT VFR BLD CALC: 38.8 % (ref 37–47)
HEMOGLOBIN: 12.6 GM/DL (ref 12.5–16)
IMMATURE NEUTROPHIL %: 0.6 % (ref 0–0.43)
INR BLD: 1 INDEX
LIPASE: 38 IU/L (ref 13–60)
LYMPHOCYTES ABSOLUTE: 3.5 K/CU MM
LYMPHOCYTES RELATIVE PERCENT: 33.6 % (ref 24–44)
MCH RBC QN AUTO: 30.7 PG (ref 27–31)
MCHC RBC AUTO-ENTMCNC: 32.5 % (ref 32–36)
MCV RBC AUTO: 94.4 FL (ref 78–100)
MONOCYTES ABSOLUTE: 0.7 K/CU MM
MONOCYTES RELATIVE PERCENT: 7 % (ref 0–4)
NUCLEATED RBC %: 0 %
PDW BLD-RTO: 12.9 % (ref 11.7–14.9)
PLATELET # BLD: 174 K/CU MM (ref 140–440)
PMV BLD AUTO: 11.6 FL (ref 7.5–11.1)
POTASSIUM SERPL-SCNC: 3.8 MMOL/L (ref 3.5–5.1)
PROTHROMBIN TIME: 13.3 SECONDS (ref 11.7–14.5)
RBC # BLD: 4.11 M/CU MM (ref 4.2–5.4)
SEGMENTED NEUTROPHILS ABSOLUTE COUNT: 5.9 K/CU MM
SEGMENTED NEUTROPHILS RELATIVE PERCENT: 57.3 % (ref 36–66)
SODIUM BLD-SCNC: 140 MMOL/L (ref 135–145)
TOTAL IMMATURE NEUTOROPHIL: 0.06 K/CU MM
TOTAL NUCLEATED RBC: 0 K/CU MM
TOTAL PROTEIN: 5.8 GM/DL (ref 6.4–8.2)
WBC # BLD: 10.3 K/CU MM (ref 4–10.5)

## 2024-03-19 PROCEDURE — 96376 TX/PRO/DX INJ SAME DRUG ADON: CPT

## 2024-03-19 PROCEDURE — 85610 PROTHROMBIN TIME: CPT

## 2024-03-19 PROCEDURE — 36415 COLL VENOUS BLD VENIPUNCTURE: CPT

## 2024-03-19 PROCEDURE — 6360000002 HC RX W HCPCS: Performed by: NURSE ANESTHETIST, CERTIFIED REGISTERED

## 2024-03-19 PROCEDURE — 2709999900 HC NON-CHARGEABLE SUPPLY: Performed by: SPECIALIST

## 2024-03-19 PROCEDURE — 80053 COMPREHEN METABOLIC PANEL: CPT

## 2024-03-19 PROCEDURE — 2580000003 HC RX 258: Performed by: STUDENT IN AN ORGANIZED HEALTH CARE EDUCATION/TRAINING PROGRAM

## 2024-03-19 PROCEDURE — 2580000003 HC RX 258: Performed by: SPECIALIST

## 2024-03-19 PROCEDURE — 85025 COMPLETE CBC W/AUTO DIFF WBC: CPT

## 2024-03-19 PROCEDURE — 3700000001 HC ADD 15 MINUTES (ANESTHESIA): Performed by: SPECIALIST

## 2024-03-19 PROCEDURE — 3609012400 HC EGD TRANSORAL BIOPSY SINGLE/MULTIPLE: Performed by: SPECIALIST

## 2024-03-19 PROCEDURE — 85730 THROMBOPLASTIN TIME PARTIAL: CPT

## 2024-03-19 PROCEDURE — C9113 INJ PANTOPRAZOLE SODIUM, VIA: HCPCS | Performed by: NURSE PRACTITIONER

## 2024-03-19 PROCEDURE — 2500000003 HC RX 250 WO HCPCS: Performed by: NURSE ANESTHETIST, CERTIFIED REGISTERED

## 2024-03-19 PROCEDURE — 96372 THER/PROPH/DIAG INJ SC/IM: CPT

## 2024-03-19 PROCEDURE — 83690 ASSAY OF LIPASE: CPT

## 2024-03-19 PROCEDURE — 3700000000 HC ANESTHESIA ATTENDED CARE: Performed by: SPECIALIST

## 2024-03-19 PROCEDURE — 88305 TISSUE EXAM BY PATHOLOGIST: CPT | Performed by: PATHOLOGY

## 2024-03-19 PROCEDURE — 6360000002 HC RX W HCPCS: Performed by: STUDENT IN AN ORGANIZED HEALTH CARE EDUCATION/TRAINING PROGRAM

## 2024-03-19 PROCEDURE — 6360000002 HC RX W HCPCS: Performed by: NURSE PRACTITIONER

## 2024-03-19 PROCEDURE — 99024 POSTOP FOLLOW-UP VISIT: CPT | Performed by: SURGERY

## 2024-03-19 PROCEDURE — 88342 IMHCHEM/IMCYTCHM 1ST ANTB: CPT | Performed by: PATHOLOGY

## 2024-03-19 PROCEDURE — G0378 HOSPITAL OBSERVATION PER HR: HCPCS

## 2024-03-19 RX ORDER — LIDOCAINE HYDROCHLORIDE 20 MG/ML
INJECTION, SOLUTION EPIDURAL; INFILTRATION; INTRACAUDAL; PERINEURAL PRN
Status: DISCONTINUED | OUTPATIENT
Start: 2024-03-19 | End: 2024-04-02 | Stop reason: SDUPTHER

## 2024-03-19 RX ORDER — PROPOFOL 10 MG/ML
INJECTION, EMULSION INTRAVENOUS PRN
Status: DISCONTINUED | OUTPATIENT
Start: 2024-03-19 | End: 2024-04-02 | Stop reason: SDUPTHER

## 2024-03-19 RX ADMIN — SODIUM CHLORIDE, POTASSIUM CHLORIDE, SODIUM LACTATE AND CALCIUM CHLORIDE: 600; 310; 30; 20 INJECTION, SOLUTION INTRAVENOUS at 09:09

## 2024-03-19 RX ADMIN — SODIUM CHLORIDE, PRESERVATIVE FREE 10 ML: 5 INJECTION INTRAVENOUS at 21:23

## 2024-03-19 RX ADMIN — PROPOFOL 500 MG: 10 INJECTION, EMULSION INTRAVENOUS at 15:24

## 2024-03-19 RX ADMIN — PANTOPRAZOLE SODIUM 40 MG: 40 INJECTION, POWDER, FOR SOLUTION INTRAVENOUS at 21:22

## 2024-03-19 RX ADMIN — PANTOPRAZOLE SODIUM 40 MG: 40 INJECTION, POWDER, FOR SOLUTION INTRAVENOUS at 09:03

## 2024-03-19 RX ADMIN — SODIUM CHLORIDE, POTASSIUM CHLORIDE, SODIUM LACTATE AND CALCIUM CHLORIDE: 600; 310; 30; 20 INJECTION, SOLUTION INTRAVENOUS at 21:26

## 2024-03-19 RX ADMIN — ENOXAPARIN SODIUM 40 MG: 100 INJECTION SUBCUTANEOUS at 08:57

## 2024-03-19 RX ADMIN — LIDOCAINE HYDROCHLORIDE 100 MG: 20 INJECTION, SOLUTION EPIDURAL; INFILTRATION; INTRACAUDAL; PERINEURAL at 15:24

## 2024-03-19 RX ADMIN — SODIUM CHLORIDE, POTASSIUM CHLORIDE, SODIUM LACTATE AND CALCIUM CHLORIDE: 600; 310; 30; 20 INJECTION, SOLUTION INTRAVENOUS at 01:51

## 2024-03-19 ASSESSMENT — PAIN - FUNCTIONAL ASSESSMENT: PAIN_FUNCTIONAL_ASSESSMENT: NONE - DENIES PAIN

## 2024-03-19 NOTE — BRIEF OP NOTE
Brief Postoperative Note      Ida Mcclellan is a 28 y.o. female     Pre-operative Diagnosis: N/ VOMITING    Post-operative Diagnosis:GASTRITIS    Procedure: EGD WITH BX    Anesthesia: MAC    Surgeons/Assistants:Alisha Duran MD     Estimated Blood Loss: NIL    Complications: None    Specimens: were obtained    REC--[facility]  Alisha Duran MD   3/19/2024   3:32 PM

## 2024-03-19 NOTE — PROGRESS NOTES
GENERAL SURGERY PROGRESS NOTE    Ida Mcclellan is a 28 y.o. female s/p lap cholecystectomy last week. Admitted with persistent epigastric pain and nausea.                 Subjective:  Doing ok this AM. Reports an episode of nausea yesterday afternoon. Feels ok this morning. Plan for EGD by GI.    Objective:    Vitals: VITALS:  /72   Pulse 50   Temp 98.2 °F (36.8 °C) (Oral)   Resp 14   Ht 1.651 m (5' 5\")   Wt 90.6 kg (199 lb 11.8 oz)   LMP 03/04/2024 (Approximate)   SpO2 97%   BMI 33.24 kg/m²     I/O: 03/18 0701 - 03/19 0700  In: 10 [I.V.:10]  Out: -     Labs/Imaging Results:   Lab Results   Component Value Date/Time     03/19/2024 05:03 AM    K 3.8 03/19/2024 05:03 AM     03/19/2024 05:03 AM    CO2 22 03/19/2024 05:03 AM    BUN 8 03/19/2024 05:03 AM    CREATININE 0.8 03/19/2024 05:03 AM    GLUCOSE 104 03/19/2024 05:03 AM    CALCIUM 8.2 03/19/2024 05:03 AM      Lab Results   Component Value Date    WBC 10.3 03/19/2024    HGB 12.6 03/19/2024    HCT 38.8 03/19/2024    MCV 94.4 03/19/2024     03/19/2024       IV Fluids:   lactated ringers IV soln Last Rate: 125 mL/hr at 03/19/24 0151    sodium chloride    Scheduled Meds:   polyethylene glycol, 17 g, Oral, BID    pantoprazole, 40 mg, IntraVENous, BID    capsaicin, , Topical, BID    sodium chloride flush, 5-40 mL, IntraVENous, 2 times per day    enoxaparin, 40 mg, SubCUTAneous, Daily    Physical Exam:  General: A&O x 3, no distress.   HEENT: Anicteric sclerae, MMM.  Extremities: No edema bilat LE.  Abdomen: Soft, mildly tender in the epigastrium. Incision sites clean and dry with dermabond.      Assessment and Plan:  28 y.o. female s/p laparoscopic cholecystectomy last week.  Admitted with persistent nausea and epigastric pain.  Symptoms similar to prior to cholecystectomy.  I suspect her symptoms have been due to another etiology all along.  Possible gastritis or PUD.    Patient Active Problem List:     Symptomatic cholelithiasis

## 2024-03-19 NOTE — PROGRESS NOTES
V2.0  [facility] [place] Progress Note      Name:  Ida Mcclellan /Age/Sex: 1995  (28 y.o. female)   MRN & CSN:  [id number] & [id number] Encounter Date/Time: 3/19/2024 7:37 AM EDT    Location:  [address] [place] [address]-A PCP: Lux Eisenberg MD       Hospital Day: 4    Assessment and Plan:   Ida Mcclellan is a 28 y.o. female with pmh of  recent laparoscopic cholecystectomy due to symptomatic cholelithiasis [facility] abdominal [facility], nausea and vomiting, who presents with Abdominal pain      Plan:    Dyspepsia with N&V   Intractable generalized abdominal pain, nausea and vomiting- improved   S/p Lap Chandni 3/12/24   Likely secondary to Cannabinoid hyperemesis syndrome vs IBS, doubt post surgical complication  CT abdomen pelvis and RUQ [facility] from [facility] reviewed, no acute intraabdominal pathology, s/p cholecystectomy on 3/12/24 with Dr. Weston   IV LR infusion  Pain control and antiemetic as needed, capsaicin topical cream BID, will add droperidol Q6 prn for possible Hyperemesis Cannabis  Clear liquid diet and advance as tolerated  Was seen by General Surgery, they recommend GI Eval  IV Protonix 40 mg BID, cont  ml/hr due to poor po intake   GI Consulted: Dr. Duran,  EGD today      Leukocytosis, 18k on admit-  resolved  No clinical evidence of infection, likely related to dehydration  Monitor CBC- now 10.3   WBC 8.8 today      Marijuana use  Counseled on cessation     Obesity/BMI 33.28  Counseled on dietary modification and weight loss    Diet Diet NPO   DVT Prophylaxis [x] Lovenox, []  Heparin, [] SCDs, [] Ambulation,  [] Eliquis, [] Xarelto  [] Coumadin   Code Status Full Code   Disposition From: Home  Expected Disposition: Home  Estimated Date of Discharge: 1 day  Patient requires continued admission due to persistent symptoms of N&V, GI evaluation in progress, EGD pending    Surrogate Decision Maker/ ROBERT Royer Mcclellan- Spouse      Personally reviewed Lab Studies and  Regular rate and rhythm, no murmurs, gallops, lifts of heaves  Respiratory: Clear to auscultation bilaterally, no wheezes, rhonchi or rales  Gastrointestinal: Soft, mild tenderness around surgical incisions and epigastric region, incisions are clean and dry, BS x 4  Genitourinary: no suprapubic tenderness  Musculoskeletal: No edema  Skin: warm, dry  Neuro: Alert and oriented x 3   Psych: Mood appropriate.     Medications:   Medications:    polyethylene glycol  17 g Oral BID    pantoprazole  40 mg IntraVENous BID    capsaicin   Topical BID    sodium chloride flush  5-40 mL IntraVENous 2 times per day    enoxaparin  40 mg SubCUTAneous Daily      Infusions:    lactated ringers IV soln 125 mL/hr at 03/19/24 0151    sodium chloride       PRN Meds: calcium carbonate, 1,000 mg, TID PRN  droPERidol, 1.25 mg, Q6H PRN  HYDROmorphone, 0.5 mg, Q4H PRN  HYDROmorphone, 1 mg, Q4H PRN  ondansetron, 4 mg, Q8H PRN   Or  ondansetron, 4 mg, Q6H PRN  sodium chloride flush, 5-40 mL, PRN  sodium chloride, , PRN        Labs      Recent Results (from the past 24 hour(s))   CBC with Auto Differential    Collection Time: 03/18/24  8:42 AM   Result Value Ref Range    WBC 8.8 4.0 - 10.5 K/CU MM    RBC 4.31 4.2 - 5.4 M/CU MM    Hemoglobin 13.2 12.5 - 16.0 GM/DL    Hematocrit 41.1 37 - 47 %    MCV 95.4 78 - 100 FL    MCH 30.6 27 - 31 PG    MCHC 32.1 32.0 - 36.0 %    RDW 12.8 11.7 - 14.9 %    Platelets 189 140 - 440 K/CU MM    MPV 11.3 (H) 7.5 - 11.1 FL    Differential Type AUTOMATED DIFFERENTIAL     Segs Relative 69.4 (H) 36 - 66 %    Lymphocytes % 23.3 (L) 24 - 44 %    Monocytes % 5.6 (H) 0 - 4 %    Eosinophils % 0.9 0 - 3 %    Basophils % 0.3 0 - 1 %    Segs Absolute 6.1 K/CU MM    Lymphocytes Absolute 2.1 K/CU MM    Monocytes Absolute 0.5 K/CU MM    Eosinophils Absolute 0.1 K/CU MM    Basophils Absolute 0.0 K/CU MM    Nucleated RBC % 0.0 %    Total Nucleated RBC 0.0 K/CU MM    Total Immature Neutrophil 0.04 K/CU MM    Immature Neutrophil % 0.5

## 2024-03-19 NOTE — ANESTHESIA PRE PROCEDURE
Department of Anesthesiology  Preprocedure Note       Name:  Ida Mcclellan   Age:  28 y.o.  :  1995                                          MRN:  [id number]         Date:  3/19/2024      Surgeon: Surgeon(s):  Alisha Duran MD    Procedure: Procedure(s):  ESOPHAGOGASTRODUODENOSCOPY    Medications prior to admission:   Prior to Admission medications    Medication Sig Start Date End Date Taking? Authorizing Provider   ondansetron (ZOFRAN-ODT) 4 MG disintegrating tablet Take 1 tablet by mouth 3 times daily as needed for Nausea or Vomiting 3/15/24   Herrera Weston MD   docusate sodium (COLACE) 100 MG capsule Take 1 capsule by mouth 2 times daily for 14 days  Patient not taking: Reported on 3/16/2024 3/12/24 3/26/24  Herrera Weston MD   clotrimazole-betamethasone (LOTRISONE) 1-0.05 % cream Apply topically 2 times daily.  Patient not taking: Reported on 3/12/2024 3/13/17   Eulalio Guy PA-C   ibuprofen (ADVIL;MOTRIN) 800 MG tablet Take 1 tablet by mouth every 6 hours as needed for Pain 9/9/15   Mor Galindo PA       Current medications:    Current Facility-Administered Medications   Medication Dose Route Frequency Provider Last Rate Last Admin    calcium carbonate (TUMS) chewable tablet 1,000 mg  1,000 mg Oral TID PRN Narcsahra, Shirlene, APRN - CNP        polyethylene glycol (GLYCOLAX) packet 17 g  17 g Oral BID Narcsahra, Shirlene, APRN - CNP   17 g at 24 1129    pantoprazole (PROTONIX) injection 40 mg  40 mg IntraVENous BID Narcsahra, Shirlene, APRN - CNP   40 mg at 24 0903    droPERidol (INAPSINE) injection 1.25 mg  1.25 mg IntraVENous Q6H PRN Narcsahra, Shirlene, APRN - CNP   1.25 mg at 24 1549    lactated ringers IV soln infusion   IntraVENous Continuous Alisha Duran  mL/hr at 24 0909 New Bag at 24 0909    HYDROmorphone (DILAUDID) injection 0.5 mg  0.5 mg IntraVENous Q4H PRN Narcelles, Shirlene, APRN - CNP        HYDROmorphone (DILAUDID) injection 1            BP Readings from Last 3 Encounters:   03/19/24 122/80   03/12/24 136/81   03/13/17 (!) 151/84       NPO Status: Time of last liquid consumption: 1200 (ice chips)                        Time of last solid consumption: 2200                        Date of last liquid consumption: 03/19/24                        Date of last solid food consumption: 03/18/24    BMI:   Wt Readings from Last 3 Encounters:   03/19/24 90.6 kg (199 lb 11.8 oz)   03/11/24 90.7 kg (200 lb)   03/13/17 88.5 kg (195 lb)     Body mass index is 33.24 kg/m².    CBC:   Lab Results   Component Value Date/Time    WBC 10.3 03/19/2024 05:03 AM    RBC 4.11 03/19/2024 05:03 AM    HGB 12.6 03/19/2024 05:03 AM    HCT 38.8 03/19/2024 05:03 AM    MCV 94.4 03/19/2024 05:03 AM    RDW 12.9 03/19/2024 05:03 AM     03/19/2024 05:03 AM       CMP:   Lab Results   Component Value Date/Time     03/19/2024 05:03 AM    K 3.8 03/19/2024 05:03 AM     03/19/2024 05:03 AM    CO2 22 03/19/2024 05:03 AM    BUN 8 03/19/2024 05:03 AM    CREATININE 0.8 03/19/2024 05:03 AM    GFRAA >60 09/07/2015 06:05 PM    LABGLOM >60 03/19/2024 05:03 AM    GLUCOSE 104 03/19/2024 05:03 AM    PROT 5.8 03/19/2024 05:03 AM    CALCIUM 8.2 03/19/2024 05:03 AM    BILITOT 0.2 03/19/2024 05:03 AM    ALKPHOS 60 03/19/2024 05:03 AM    AST 12 03/19/2024 05:03 AM    ALT 21 03/19/2024 05:03 AM       POC Tests: No results for input(s): \"POCGLU\", \"POCNA\", \"POCK\", \"POCCL\", \"POCBUN\", \"POCHEMO\", \"POCHCT\" in the last 72 hours.    Coags:   Lab Results   Component Value Date/Time    PROTIME 13.3 03/19/2024 05:03 AM    INR 1.0 03/19/2024 05:03 AM    APTT 22.2 03/19/2024 05:03 AM       HCG (If Applicable):   Lab Results   Component Value Date    PREGTESTUR NEGATIVE 03/17/2024        ABGs: No results found for: \"PHART\", \"PO2ART\", \"LAF9WNT\", \"QSM4UYI\", \"BEART\", \"J3DZSMFB\"     Type & Screen (If Applicable):  No results found for: \"LABABO\", \"LABRH\"    Drug/Infectious Status (If Applicable):  No

## 2024-03-19 NOTE — CONSULTS
[place] [address] [place] [id number]                              CONSULTATION      PATIENT NAME: CARLEE TREVIZO             : 1995  MED REC NO: [id number]                      ROOM: 1108  ACCOUNT NO: [id number]                       ADMIT DATE: 2024  PROVIDER: Alisha Duran MD      REFERRING PHYSICIAN:  LYDIA DIAZ    CHIEF COMPLAINT:    1. Upper abdominal pain with intractable nausea and vomiting.  2. The patient is status post laparoscopic cholecystectomy for cholelithiasis on 2024.    HISTORY OF PRESENT ILLNESS:  The patient is a 28-year-old white female in apparently good health with history of recreational drug use (marijuana), who initially presented to the emergency room on the  with abdominal pain along with nausea and vomiting.  According to the patient, she has been symptomatic with severe upper abdominal pain along with nausea and vomiting for the past 1 month.  The patient had an ultrasound done on the  which showed multiple gallstones and also positive Jimenez sign.  The patient underwent laparoscopic cholecystectomy by Dr. Weston on the  and was discharged home on the same day, but presented to [facility] Emergency Room in [place] on the  with severe intractable upper abdominal pain along with nausea, vomiting.  According to the patient, she never had relief from her symptoms even after the laparoscopic cholecystectomy.  There is no history of hematemesis, melena, fever, chills, anorexia, or weight loss.  The blood workup done today comprised of Chem profile, LFTs, and CBC, which are unremarkable.  The patient had a repeat CAT scan and ultrasound done at [facility] ER in [place] prior to coming to [facility] and the exams were unremarkable with no postop complications noted.  The patient has been seen by the  upper abdominal pain along with nausea and vomiting for the past 1 month and rule out cannabinoid hyperemesis syndrome.  2. History of gallstones, status post laparoscopic cholecystectomy on the 12th of March 2024.    RECOMMENDATIONS:    1. Agree with present management.  2. I am going to start IV fluids since the patient's oral intake is extremely poor.  3. Antiemetics and analgesics as necessary for abdominal symptoms.  4. We will continue the patient on Protonix.   5. We will proceed with EGD as a part of workup of the patient's intractable upper abdominal pain and nausea and vomiting.  6. The case and plan have been discussed in detail with the patient and all her questions have been answered.  7. The case and plan have also been discussed with the patient's bedside RN, Inga.  8. The patient has been strongly instructed to quit using recreational drugs.          WENDY MCKEON MD      D:  03/18/2024 15:15:32     T:  03/18/2024 19:57:46     [place]/CARLENE  Job #:  [id number]     Doc#:  [id number]

## 2024-03-19 NOTE — PROCEDURES
[address] [address]                             PROCEDURE NOTE      PATIENT NAME: CARLEE TREVIZO             : 1995  MED REC NO: [id number]                      ROOM: 1108  ACCOUNT NO: [id number]                       ADMIT DATE: 2024  PROVIDER: Alisha Duran MD      DATE OF PROCEDURE:      SURGEON:  Alisha Duran MD    PROCEDURE PERFORMED:  Esophagogastroduodenoscopy with biopsy.    CHIEF COMPLAINT:  Upper abdominal pain with history of intractable nausea and vomiting.    PREMEDICATION:  Please refer to the anesthesiologist's notes.    DESCRIPTION OF PROCEDURE:  The patient was placed in the left lateral decubitus position and the video Olympus gastroscope was introduced into the back of the throat and was advanced into the esophagus.    Esophagus:  The mucosa of the esophagus was unremarkable.  There was no evidence of hyperemia, erosion, ulcer, stricture, Cast esophagus, or mass lesion.  Multiple biopsies were obtained from the mid and distal esophagus and submitted for histopathology to rule out eosinophilic esophagitis.    Stomach:  The fundus, cardia, body, antrum, lesser curvature, greater curvature, and the pyloric regions of the stomach were examined.  The mucosa of the stomach was diffusely hyperemic, but no erosion or ulcers were seen.  The gastroscope was retroflexed and the fundus and cardia were carefully examined and no mass lesions were seen.  Multiple biopsies were obtained from the body and antrum of the stomach and sent for histopathology to rule out the H pylori infection.    Duodenum:  The first and second portions of the duodenum were examined and the mucosa was unremarkable.  Multiple biopsies were obtained and sent for histopathology to rule out a celiac disease.    POSTOPERATIVE DIAGNOSIS:  Mild superficial gastritis, otherwise normal esophagogastroduodenoscopy.    RECOMMENDATIONS:    1.

## 2024-03-20 ENCOUNTER — TELEPHONE (OUTPATIENT)
Dept: SURGERY | Age: 29
End: 2024-03-20

## 2024-03-20 VITALS
HEIGHT: 65 IN | RESPIRATION RATE: 17 BRPM | SYSTOLIC BLOOD PRESSURE: 124 MMHG | TEMPERATURE: 98.1 F | DIASTOLIC BLOOD PRESSURE: 76 MMHG | WEIGHT: 199.74 LBS | OXYGEN SATURATION: 97 % | BODY MASS INDEX: 33.28 KG/M2 | HEART RATE: 62 BPM

## 2024-03-20 PROBLEM — R10.9 ABDOMINAL PAIN: Status: RESOLVED | Noted: 2024-03-16 | Resolved: 2024-03-20

## 2024-03-20 PROCEDURE — 94761 N-INVAS EAR/PLS OXIMETRY MLT: CPT

## 2024-03-20 PROCEDURE — G0378 HOSPITAL OBSERVATION PER HR: HCPCS

## 2024-03-20 PROCEDURE — 6360000002 HC RX W HCPCS: Performed by: NURSE PRACTITIONER

## 2024-03-20 PROCEDURE — 99024 POSTOP FOLLOW-UP VISIT: CPT | Performed by: SURGERY

## 2024-03-20 PROCEDURE — 96361 HYDRATE IV INFUSION ADD-ON: CPT

## 2024-03-20 PROCEDURE — 2580000003 HC RX 258: Performed by: STUDENT IN AN ORGANIZED HEALTH CARE EDUCATION/TRAINING PROGRAM

## 2024-03-20 PROCEDURE — 96376 TX/PRO/DX INJ SAME DRUG ADON: CPT

## 2024-03-20 PROCEDURE — C9113 INJ PANTOPRAZOLE SODIUM, VIA: HCPCS | Performed by: NURSE PRACTITIONER

## 2024-03-20 PROCEDURE — 2580000003 HC RX 258: Performed by: SPECIALIST

## 2024-03-20 RX ORDER — CALCIUM CARBONATE 500 MG/1
1000 TABLET, CHEWABLE ORAL 3 TIMES DAILY PRN
Qty: 60 TABLET | Refills: 0 | Status: SHIPPED | OUTPATIENT
Start: 2024-03-20 | End: 2024-04-19

## 2024-03-20 RX ORDER — PANTOPRAZOLE SODIUM 40 MG/1
TABLET, DELAYED RELEASE ORAL
Qty: 58 TABLET | Refills: 0 | Status: SHIPPED | OUTPATIENT
Start: 2024-03-20 | End: 2024-05-03

## 2024-03-20 RX ORDER — ONDANSETRON 4 MG/1
4 TABLET, ORALLY DISINTEGRATING ORAL 3 TIMES DAILY PRN
Qty: 30 TABLET | Refills: 0 | Status: SHIPPED | OUTPATIENT
Start: 2024-03-20

## 2024-03-20 RX ADMIN — SODIUM CHLORIDE, PRESERVATIVE FREE 10 ML: 5 INJECTION INTRAVENOUS at 09:20

## 2024-03-20 RX ADMIN — PANTOPRAZOLE SODIUM 40 MG: 40 INJECTION, POWDER, FOR SOLUTION INTRAVENOUS at 09:19

## 2024-03-20 RX ADMIN — SODIUM CHLORIDE, POTASSIUM CHLORIDE, SODIUM LACTATE AND CALCIUM CHLORIDE: 600; 310; 30; 20 INJECTION, SOLUTION INTRAVENOUS at 02:19

## 2024-03-20 NOTE — PROGRESS NOTES
Outpatient Pharmacy Progress Note for Meds-to-Beds    Total number of Prescriptions Filled: 2  The following medications were dispensed to the patient during the discharge process:  Zofran  Protonix    Additional Documentation:  Medication(s) were delivered to the patient's room prior to discharge  Med Assist was able to help cover the cost of the medications to provide patient with a $0.00 co-pay.   The following prescription(s) were not covered under the patient's insurance because they can be purchased as OTC medications: Tums       Thank you for letting us serve your patients.  [facility] [place] [address]    Phone: [phone]    Fax: [phone]

## 2024-03-20 NOTE — PROGRESS NOTES
GENERAL SURGERY PROGRESS NOTE    Ida Mcclellan is a 28 y.o. female s/p lap cholecystectomy last week. Admitted with persistent epigastric pain and nausea.                 Subjective:  Doing ok this AM. Denies significant pain. Mild nausea after breakfast but much better.    Objective:    Vitals: VITALS:  /76   Pulse 62   Temp 98.1 °F (36.7 °C) (Oral)   Resp 17   Ht 1.651 m (5' 5\")   Wt 90.6 kg (199 lb 11.8 oz)   LMP 03/04/2024 (Approximate)   SpO2 97%   BMI 33.24 kg/m²     I/O: 03/19 0701 - 03/20 0700  In: 250 [P.O.:240; I.V.:10]  Out: -     Labs/Imaging Results:   Lab Results   Component Value Date/Time     03/19/2024 05:03 AM    K 3.8 03/19/2024 05:03 AM     03/19/2024 05:03 AM    CO2 22 03/19/2024 05:03 AM    BUN 8 03/19/2024 05:03 AM    CREATININE 0.8 03/19/2024 05:03 AM    GLUCOSE 104 03/19/2024 05:03 AM    CALCIUM 8.2 03/19/2024 05:03 AM      Lab Results   Component Value Date    WBC 10.3 03/19/2024    HGB 12.6 03/19/2024    HCT 38.8 03/19/2024    MCV 94.4 03/19/2024     03/19/2024       IV Fluids:   sodium chloride    Scheduled Meds:   polyethylene glycol, 17 g, Oral, BID    pantoprazole, 40 mg, IntraVENous, BID    capsaicin, , Topical, BID    sodium chloride flush, 5-40 mL, IntraVENous, 2 times per day    enoxaparin, 40 mg, SubCUTAneous, Daily    Physical Exam:  General: A&O x 3, no distress.   HEENT: Anicteric sclerae, MMM.  Extremities: No edema bilat LE.  Abdomen: Soft, mildly tender in the epigastrium. Incision sites clean and dry with dermabond.      Assessment and Plan:  28 y.o. female s/p laparoscopic cholecystectomy last week.  Admitted with persistent nausea and epigastric pain.  Symptoms similar to prior to cholecystectomy.  I suspect her symptoms have been due to another etiology all along.      Gastritis identified on EGD    Patient Active Problem List:     Symptomatic cholelithiasis     Abdominal pain    - appreciate GI evaluation  - ok to discharge from a  surgical perspective   - continue PPI  - will follow    Herrera Weston MD

## 2024-03-20 NOTE — CARE COORDINATION
Received referral from [facility] pharmacy to assist with medication cost(s) at time of discharge.  Approved a 1x voucher for protonix and zofran.  Faxed voucher to [facility] pharmacy.    Added patient to the flagged list. If pt requests additional help a Med Assist application must be completed and required documents provided to determine eligibility for ongoing assistance.

## 2024-03-20 NOTE — DISCHARGE SUMMARY
Discharge Summary    Name:  Ida Mcclellan /Age/Sex: 1995  (28 y.o. female)   MRN & CSN:  [id number] & [id number] Admission Date/Time: 3/16/2024 11:13 PM   Attending:  No att. providers found Discharging Physician: Sondra Corral MD     Discharge diagnosis and plan:    Dyspepsia with N&V, resolved   Intractable generalized abdominal pain, nausea and vomiting- resolved   Symptomatic cholelithiasis s/p Lap Chandni 3/12/24   Likely secondary to Cannabinoid hyperemesis syndrome vs IBS, doubt post surgical complication  CT abdomen pelvis and RUQ US reports from [facility] ER reviewed, no acute intraabdominal pathology, s/p cholecystectomy on 3/12/24 with Dr. Weston. IV LR infusion was used for fluid resuscitation. Pain control and antiemetic as needed, capsaicin topical cream BID, also added droperidol Q6 prn for possible Hyperemesis Cannabis. Diet advanced and tolerated regular diet. GI team were consulted and patient underwent EGD which showed mild superficial gastritis. Patient was already on IV Protonix 40 mg BID. Patient will be discharged on pantoprazole oral 40 mg twice daily x 2 weeks then once daily. Zofran as needed.     Leukocytosis, 18k on admit-  resolved  No clinical evidence of infection, likely related to dehydration.     Marijuana use  Counseled on cessation     Obesity/BMI 33.28  Counseled on dietary modification and weight loss      Discharge Exam  /76   Pulse 62   Temp 98.1 °F (36.7 °C) (Oral)   Resp 17   Ht 1.651 m (5' 5\")   Wt 90.6 kg (199 lb 11.8 oz)   LMP 2024 (Approximate)   SpO2 97%   BMI 33.24 kg/m²     Physical Exam  Constitutional:       General: She is not in acute distress.     Appearance: She is well-developed.   HENT:      Head: Normocephalic and atraumatic.      Right Ear: External ear normal.      Left Ear: External ear normal.      Nose: Nose normal.   Eyes:      General: No scleral icterus.        Right eye: No discharge.         Left eye: No discharge.

## 2024-03-20 NOTE — PROGRESS NOTES
DOING WELL NO ABD COMPLAINTS AT ALL NO ABD PAIN N/ VOMITING TOLERATING DIET WELL  VITALS STABLE   LABS NOTED  EGD FINDINGS D/W PT  PATH REPORT PENDING D/W PT IN DETAIL AND KARTHIK ESCOBAR AS WELL WILL CPM HOME SOON

## 2024-04-08 NOTE — ANESTHESIA POSTPROCEDURE EVALUATION
Department of Anesthesiology  Postprocedure Note    Patient: Ida Mcclellan  MRN: [id number]  YOB: 1995  Date of evaluation: 4/8/2024    Procedure Summary       Date: 03/19/24 Room / Location: [address]    Anesthesia Start: 1517 Anesthesia Stop: 1540    Procedure: ESOPHAGOGASTRODUODENOSCOPY BIOPSY Diagnosis:       Nausea and vomiting, unspecified vomiting type      (Nausea and vomiting, unspecified vomiting type [R11.2])    Surgeons: Alisha Duran MD Responsible Provider: Jose Siu APRN - CRNA    Anesthesia Type: MAC ASA Status: 2            Anesthesia Type: MAC    Na Phase I: Na Score: 10    Na Phase II:      Anesthesia Post Evaluation    Patient location during evaluation: bedside  Patient participation: complete - patient participated  Level of consciousness: awake  Pain score: 0  Airway patency: patent  Nausea & Vomiting: no vomiting and no nausea  Cardiovascular status: blood pressure returned to baseline and hemodynamically stable  Respiratory status: acceptable and room air  Hydration status: euvolemic  Pain management: adequate    No notable events documented.

## 2025-03-03 ENCOUNTER — HOSPITAL ENCOUNTER (EMERGENCY)
Age: 30
Discharge: HOME | End: 2025-03-03
Attending: EMERGENCY MEDICINE
Payer: MEDICAID

## 2025-03-03 VITALS
OXYGEN SATURATION: 97 % | BODY MASS INDEX: 38.32 KG/M2 | HEIGHT: 65 IN | DIASTOLIC BLOOD PRESSURE: 94 MMHG | TEMPERATURE: 97.7 F | WEIGHT: 230 LBS | HEART RATE: 97 BPM | SYSTOLIC BLOOD PRESSURE: 152 MMHG | RESPIRATION RATE: 17 BRPM

## 2025-03-03 DIAGNOSIS — R05.1 ACUTE COUGH: Primary | ICD-10-CM

## 2025-03-03 DIAGNOSIS — R51.9 ACUTE NONINTRACTABLE HEADACHE, UNSPECIFIED HEADACHE TYPE: ICD-10-CM

## 2025-03-03 PROCEDURE — 96372 THER/PROPH/DIAG INJ SC/IM: CPT

## 2025-03-03 PROCEDURE — 99284 EMERGENCY DEPT VISIT MOD MDM: CPT

## 2025-03-03 PROCEDURE — 6360000002 HC RX W HCPCS: Performed by: EMERGENCY MEDICINE

## 2025-03-03 RX ORDER — KETOROLAC TROMETHAMINE 15 MG/ML
15 INJECTION, SOLUTION INTRAMUSCULAR; INTRAVENOUS ONCE
Status: COMPLETED | OUTPATIENT
Start: 2025-03-03 | End: 2025-03-03

## 2025-03-03 RX ORDER — DULOXETIN HYDROCHLORIDE 20 MG/1
20 CAPSULE, DELAYED RELEASE ORAL DAILY
COMMUNITY

## 2025-03-03 RX ORDER — BENZONATATE 100 MG/1
100 CAPSULE ORAL 3 TIMES DAILY PRN
Qty: 10 CAPSULE | Refills: 0 | Status: SHIPPED | OUTPATIENT
Start: 2025-03-03 | End: 2025-03-10

## 2025-03-03 RX ORDER — GUAIFENESIN/DEXTROMETHORPHAN 100-10MG/5
5 SYRUP ORAL 4 TIMES DAILY PRN
Qty: 120 ML | Refills: 0 | Status: SHIPPED | OUTPATIENT
Start: 2025-03-03 | End: 2025-03-13

## 2025-03-03 RX ADMIN — KETOROLAC TROMETHAMINE 15 MG: 15 INJECTION, SOLUTION INTRAMUSCULAR; INTRAVENOUS at 15:59

## 2025-03-03 ASSESSMENT — PAIN DESCRIPTION - DESCRIPTORS
DESCRIPTORS: THROBBING
DESCRIPTORS: ACHING

## 2025-03-03 ASSESSMENT — PAIN DESCRIPTION - LOCATION
LOCATION: HEAD
LOCATION: HEAD

## 2025-03-03 ASSESSMENT — PAIN DESCRIPTION - ORIENTATION: ORIENTATION: LEFT;ANTERIOR

## 2025-03-03 ASSESSMENT — PAIN SCALES - GENERAL
PAINLEVEL_OUTOF10: 9
PAINLEVEL_OUTOF10: 9

## 2025-03-03 ASSESSMENT — PAIN DESCRIPTION - PAIN TYPE: TYPE: ACUTE PAIN

## 2025-03-03 ASSESSMENT — PAIN - FUNCTIONAL ASSESSMENT
PAIN_FUNCTIONAL_ASSESSMENT: ACTIVITIES ARE NOT PREVENTED
PAIN_FUNCTIONAL_ASSESSMENT: 0-10
PAIN_FUNCTIONAL_ASSESSMENT: ACTIVITIES ARE NOT PREVENTED

## 2025-03-03 NOTE — ED PROVIDER NOTES
3 times daily as needed for Cough    GUAIFENESIN-DEXTROMETHORPHAN (ROBITUSSIN DM) 100-10 MG/5ML SYRUP    Take 5 mLs by mouth 4 times daily as needed for Cough     ED Provider Disposition Time  DISPOSITION Discharge - Pending Orders Complete 03/03/2025 04:04:11 PM   DISPOSITION CONDITION Stable           Comment: Please note this report has been produced using speech recognition software and may contain errors related to that system including errors in grammar, punctuation, and spelling, as well as words and phrases that may be inappropriate.  Efforts were made to edit the dictations.        Penelope Garay MD  03/03/25 [id number]

## 2025-04-04 ENCOUNTER — INITIAL CONSULT (OUTPATIENT)
Dept: OBGYN | Age: 30
End: 2025-04-04

## 2025-04-04 ENCOUNTER — HOSPITAL ENCOUNTER (OUTPATIENT)
Age: 30
Setting detail: SPECIMEN
Discharge: HOME | End: 2025-04-04
Payer: COMMERCIAL

## 2025-04-04 VITALS
HEIGHT: 65 IN | SYSTOLIC BLOOD PRESSURE: 113 MMHG | DIASTOLIC BLOOD PRESSURE: 72 MMHG | BODY MASS INDEX: 37.82 KG/M2 | WEIGHT: 227 LBS | HEART RATE: 93 BPM

## 2025-04-04 DIAGNOSIS — Z01.419 WOMEN'S ANNUAL ROUTINE GYNECOLOGICAL EXAMINATION: Primary | ICD-10-CM

## 2025-04-04 DIAGNOSIS — E66.9 OBESITY, UNSPECIFIED CLASS, UNSPECIFIED OBESITY TYPE, UNSPECIFIED WHETHER SERIOUS COMORBIDITY PRESENT: ICD-10-CM

## 2025-04-04 DIAGNOSIS — B00.9 HSV-2 INFECTION: ICD-10-CM

## 2025-04-04 DIAGNOSIS — Z11.3 SCREENING EXAMINATION FOR VENEREAL DISEASE: ICD-10-CM

## 2025-04-04 PROCEDURE — G0123 SCREEN CERV/VAG THIN LAYER: HCPCS

## 2025-04-04 PROCEDURE — 87591 N.GONORRHOEAE DNA AMP PROB: CPT

## 2025-04-04 PROCEDURE — 87491 CHLMYD TRACH DNA AMP PROBE: CPT

## 2025-04-04 RX ORDER — METHYLPHENIDATE HYDROCHLORIDE 5 MG/1
TABLET ORAL
COMMUNITY
Start: 2025-04-01

## 2025-04-04 SDOH — ECONOMIC STABILITY: FOOD INSECURITY: WITHIN THE PAST 12 MONTHS, YOU WORRIED THAT YOUR FOOD WOULD RUN OUT BEFORE YOU GOT MONEY TO BUY MORE.: NEVER TRUE

## 2025-04-04 SDOH — ECONOMIC STABILITY: FOOD INSECURITY: WITHIN THE PAST 12 MONTHS, THE FOOD YOU BOUGHT JUST DIDN'T LAST AND YOU DIDN'T HAVE MONEY TO GET MORE.: NEVER TRUE

## 2025-04-04 ASSESSMENT — PATIENT HEALTH QUESTIONNAIRE - PHQ9
SUM OF ALL RESPONSES TO PHQ QUESTIONS 1-9: 0
2. FEELING DOWN, DEPRESSED OR HOPELESS: NOT AT ALL
SUM OF ALL RESPONSES TO PHQ QUESTIONS 1-9: 0
SUM OF ALL RESPONSES TO PHQ QUESTIONS 1-9: 0
1. LITTLE INTEREST OR PLEASURE IN DOING THINGS: NOT AT ALL
SUM OF ALL RESPONSES TO PHQ QUESTIONS 1-9: 0

## 2025-04-04 ASSESSMENT — ENCOUNTER SYMPTOMS
SHORTNESS OF BREATH: 0
DIARRHEA: 0
VOMITING: 0
ABDOMINAL PAIN: 0
NAUSEA: 0
RESPIRATORY NEGATIVE: 1
GASTROINTESTINAL NEGATIVE: 1
CONSTIPATION: 0

## 2025-04-04 NOTE — PROGRESS NOTES
25    Ida Mcclellan  1995    Chief Complaint   Patient presents with    Annual Exam     Annual exam. Smoker  No known h/o dvt. LMP 3/27/25..Periods are regular.Patient is sexually active. Patient is on oral contraception. Pap Smear was  , in [place], and results were negativeHPV not ordered. . Patient has no complaints. Would like to discuss STD check.      Consultation     Pt referred by  Janee Leos PA-C for [facility] womens routine check.         The patient is a 29 y.o. female,  who presents for her annual exam.  She is menstruating normally.  She is  sexually active. She is currently taking birth control.  Birth control method is oral contraceptive    She reports no gynecological symptoms.      Pap smear history: Her last PAP smear was in .  Her results were normal per patient.    Past Medical History:   Diagnosis Date    Herpes     Thyroid disease        Past Surgical History:   Procedure Laterality Date    CHOLECYSTECTOMY, LAPAROSCOPIC N/A 3/12/2024    CHOLECYSTECTOMY LAPAROSCOPIC performed by Herrera Weston MD at [facility] OR    UPPER GASTROINTESTINAL ENDOSCOPY N/A 3/19/2024    ESOPHAGOGASTRODUODENOSCOPY BIOPSY performed by Alisha Duran MD at [facility] ENDOSCOPY       Family History   Problem Relation Age of Onset    Diabetes Paternal Grandfather     Other (prediabetic) Father     Other (prediabetic) Mother        Social History     Tobacco Use    Smoking status: Former     Current packs/day: 0.50     Average packs/day: 0.5 packs/day for 0.3 years (0.1 ttl pk-yrs)     Types: Cigarettes     Start date: 2025   Vaping Use    Vaping status: Every Day   Substance Use Topics    Alcohol use: Yes     Comment: occasionally    Drug use: Not Currently       Current Outpatient Medications   Medication Sig Dispense Refill    methylphenidate (RITALIN) 5 MG tablet       DULoxetine (CYMBALTA) 20 MG extended release capsule Take 1 capsule by mouth daily      pantoprazole (PROTONIX) 40 MG

## 2025-04-09 LAB
C TRACH SPEC QL CULT: NEGATIVE
NEISSERIA GONORRHOEAE, CULTURE: NEGATIVE

## 2025-04-10 LAB — GYNECOLOGY CYTOLOGY REPORT: NORMAL

## 2025-07-22 ENCOUNTER — LAB (OUTPATIENT)
Dept: OBGYN | Age: 30
End: 2025-07-22

## 2025-07-22 DIAGNOSIS — Z11.3 SCREENING EXAMINATION FOR VENEREAL DISEASE: ICD-10-CM

## 2025-07-23 LAB
HBV SURFACE AG SERPL QL IA: NORMAL
HIV 1+2 AB+HIV1 P24 AG SERPL QL IA: NORMAL
HIV 2 AB SERPL QL IA: NORMAL
HIV1 AB SERPL QL IA: NORMAL
HIV1 P24 AG SERPL QL IA: NORMAL
REAGIN+T PALLIDUM IGG+IGM SERPL-IMP: NORMAL

## (undated) DEVICE — SCISSORS ENDOSCP DIA5MM CRV MPLR CAUT W/ RATCH HNDL

## (undated) DEVICE — Z INACTIVE NO ACTIVE SUPPLIER APPLICATOR MEDICATED 26 CC TINT HI-LITE ORNG STRL CHLORAPREP

## (undated) DEVICE — FORCEPS BX L240CM JAW DIA2.8MM L CAP W/ NDL MIC MESH TOOTH

## (undated) DEVICE — TROCAR: Brand: KII® SLEEVE

## (undated) DEVICE — SUTURE SZ 0 27IN 5/8 CIR UR-6  TAPER PT VIOLET ABSRB VICRYL J603H

## (undated) DEVICE — TROCAR: Brand: KII FIOS FIRST ENTRY

## (undated) DEVICE — SET TBNG DISP TIP FOR AHTO

## (undated) DEVICE — ELECTRODE ES AD CRDLSS PT RET REM POLYHESIVE

## (undated) DEVICE — SUTURE MCRYL SZ 4-0 L18IN ABSRB UD L19MM PS-2 3/8 CIR PRIM Y496G

## (undated) DEVICE — Device

## (undated) DEVICE — SYRINGE MED 20ML STD CLR PLAS LUERLOCK TIP N CTRL DISP

## (undated) DEVICE — TOWEL,OR,DSP,ST,BLUE,STD,6/PK,12PK/CS: Brand: MEDLINE

## (undated) DEVICE — Z DUP USE 2641840 CLIP INT L POLYMER LOK LIG HEM O LOK

## (undated) DEVICE — BAG SPEC REM 224ML W4XL6IN DIA10MM 1 HND GYN DISP ENDOPCH

## (undated) DEVICE — PACK SURG LAP CHOLE

## (undated) DEVICE — TOWEL,OR,DSP,ST,WHITE,DLX,XR,4/PK,20PK/C: Brand: MEDLINE

## (undated) DEVICE — ADHESIVE SKIN CLSR 0.7ML TOP DERMBND ADV

## (undated) DEVICE — TUBING INSUFFLATOR HEAT HI FLO SET PNEUMOCLEAR

## (undated) DEVICE — SOLUTION IV IRRIG WATER 1000ML POUR BRL 2F7114

## (undated) DEVICE — GLOVE ORANGE PI 7   MSG9070

## (undated) DEVICE — GLOVE SURG SZ 75 L12IN FNGR THK79MIL GRN LTX FREE

## (undated) DEVICE — NEEDLE INSUF L120MM DIA2MM DISP FOR PNEUMOPERI ENDOPATH

## (undated) DEVICE — NEEDLE HYPO 20GA L1.5IN YEL POLYPR HUB S STL REG BVL STR